# Patient Record
Sex: MALE | NOT HISPANIC OR LATINO | URBAN - METROPOLITAN AREA
[De-identification: names, ages, dates, MRNs, and addresses within clinical notes are randomized per-mention and may not be internally consistent; named-entity substitution may affect disease eponyms.]

---

## 2018-08-27 ENCOUNTER — INPATIENT (INPATIENT)
Facility: HOSPITAL | Age: 38
LOS: 2 days | Discharge: HOME HEALTH | End: 2018-08-30
Payer: COMMERCIAL

## 2018-08-27 VITALS
RESPIRATION RATE: 16 BRPM | OXYGEN SATURATION: 100 % | HEART RATE: 100 BPM | DIASTOLIC BLOOD PRESSURE: 97 MMHG | TEMPERATURE: 99 F | SYSTOLIC BLOOD PRESSURE: 141 MMHG

## 2018-08-27 DIAGNOSIS — K85.90 ACUTE PANCREATITIS WITHOUT NECROSIS OR INFECTION, UNSPECIFIED: ICD-10-CM

## 2018-08-27 LAB
ALBUMIN SERPL ELPH-MCNC: 3.9 G/DL — SIGNIFICANT CHANGE UP (ref 3.3–5)
ALP SERPL-CCNC: 72 U/L — SIGNIFICANT CHANGE UP (ref 40–120)
ALT FLD-CCNC: 54 U/L — HIGH (ref 4–41)
APPEARANCE UR: CLEAR — SIGNIFICANT CHANGE UP
AST SERPL-CCNC: 25 U/L — SIGNIFICANT CHANGE UP (ref 4–40)
BACTERIA # UR AUTO: NEGATIVE — SIGNIFICANT CHANGE UP
BASOPHILS # BLD AUTO: 0.04 K/UL — SIGNIFICANT CHANGE UP (ref 0–0.2)
BASOPHILS NFR BLD AUTO: 0.4 % — SIGNIFICANT CHANGE UP (ref 0–2)
BILIRUB SERPL-MCNC: 0.6 MG/DL — SIGNIFICANT CHANGE UP (ref 0.2–1.2)
BILIRUB UR-MCNC: NEGATIVE — SIGNIFICANT CHANGE UP
BLOOD UR QL VISUAL: SIGNIFICANT CHANGE UP
BUN SERPL-MCNC: 7 MG/DL — SIGNIFICANT CHANGE UP (ref 7–23)
BUN SERPL-MCNC: 9 MG/DL — SIGNIFICANT CHANGE UP (ref 7–23)
CALCIUM SERPL-MCNC: 9.7 MG/DL — SIGNIFICANT CHANGE UP (ref 8.4–10.5)
CALCIUM SERPL-MCNC: 9.9 MG/DL — SIGNIFICANT CHANGE UP (ref 8.4–10.5)
CHLORIDE SERPL-SCNC: 94 MMOL/L — LOW (ref 98–107)
CHLORIDE SERPL-SCNC: 97 MMOL/L — LOW (ref 98–107)
CO2 SERPL-SCNC: 24 MMOL/L — SIGNIFICANT CHANGE UP (ref 22–31)
CO2 SERPL-SCNC: 24 MMOL/L — SIGNIFICANT CHANGE UP (ref 22–31)
COLOR SPEC: YELLOW — SIGNIFICANT CHANGE UP
CREAT SERPL-MCNC: 0.73 MG/DL — SIGNIFICANT CHANGE UP (ref 0.5–1.3)
CREAT SERPL-MCNC: 0.79 MG/DL — SIGNIFICANT CHANGE UP (ref 0.5–1.3)
EOSINOPHIL # BLD AUTO: 0.2 K/UL — SIGNIFICANT CHANGE UP (ref 0–0.5)
EOSINOPHIL NFR BLD AUTO: 1.9 % — SIGNIFICANT CHANGE UP (ref 0–6)
GLUCOSE BLDC GLUCOMTR-MCNC: 122 MG/DL — HIGH (ref 70–99)
GLUCOSE BLDC GLUCOMTR-MCNC: 146 MG/DL — HIGH (ref 70–99)
GLUCOSE BLDC GLUCOMTR-MCNC: 180 MG/DL — HIGH (ref 70–99)
GLUCOSE BLDC GLUCOMTR-MCNC: 192 MG/DL — HIGH (ref 70–99)
GLUCOSE SERPL-MCNC: 117 MG/DL — HIGH (ref 70–99)
GLUCOSE SERPL-MCNC: 295 MG/DL — HIGH (ref 70–99)
GLUCOSE UR-MCNC: >1000 — SIGNIFICANT CHANGE UP
HCT VFR BLD CALC: 44 % — SIGNIFICANT CHANGE UP (ref 39–50)
HGB BLD-MCNC: 14.3 G/DL — SIGNIFICANT CHANGE UP (ref 13–17)
HYALINE CASTS # UR AUTO: NEGATIVE — SIGNIFICANT CHANGE UP
IMM GRANULOCYTES # BLD AUTO: 0.05 # — SIGNIFICANT CHANGE UP
IMM GRANULOCYTES NFR BLD AUTO: 0.5 % — SIGNIFICANT CHANGE UP (ref 0–1.5)
KETONES UR-MCNC: HIGH
LEUKOCYTE ESTERASE UR-ACNC: NEGATIVE — SIGNIFICANT CHANGE UP
LIDOCAIN IGE QN: 56.5 U/L — SIGNIFICANT CHANGE UP (ref 7–60)
LYMPHOCYTES # BLD AUTO: 2.45 K/UL — SIGNIFICANT CHANGE UP (ref 1–3.3)
LYMPHOCYTES # BLD AUTO: 23.6 % — SIGNIFICANT CHANGE UP (ref 13–44)
MAGNESIUM SERPL-MCNC: 1.9 MG/DL — SIGNIFICANT CHANGE UP (ref 1.6–2.6)
MCHC RBC-ENTMCNC: 28.7 PG — SIGNIFICANT CHANGE UP (ref 27–34)
MCHC RBC-ENTMCNC: 32.5 % — SIGNIFICANT CHANGE UP (ref 32–36)
MCV RBC AUTO: 88.4 FL — SIGNIFICANT CHANGE UP (ref 80–100)
MONOCYTES # BLD AUTO: 0.66 K/UL — SIGNIFICANT CHANGE UP (ref 0–0.9)
MONOCYTES NFR BLD AUTO: 6.3 % — SIGNIFICANT CHANGE UP (ref 2–14)
NEUTROPHILS # BLD AUTO: 7 K/UL — SIGNIFICANT CHANGE UP (ref 1.8–7.4)
NEUTROPHILS NFR BLD AUTO: 67.3 % — SIGNIFICANT CHANGE UP (ref 43–77)
NITRITE UR-MCNC: NEGATIVE — SIGNIFICANT CHANGE UP
NRBC # FLD: 0 — SIGNIFICANT CHANGE UP
PH UR: 6.5 — SIGNIFICANT CHANGE UP (ref 5–8)
PHOSPHATE SERPL-MCNC: 2.3 MG/DL — LOW (ref 2.5–4.5)
PLATELET # BLD AUTO: 261 K/UL — SIGNIFICANT CHANGE UP (ref 150–400)
PMV BLD: 10.6 FL — SIGNIFICANT CHANGE UP (ref 7–13)
POTASSIUM SERPL-MCNC: 3.4 MMOL/L — LOW (ref 3.5–5.3)
POTASSIUM SERPL-MCNC: 4.2 MMOL/L — SIGNIFICANT CHANGE UP (ref 3.5–5.3)
POTASSIUM SERPL-SCNC: 3.4 MMOL/L — LOW (ref 3.5–5.3)
POTASSIUM SERPL-SCNC: 4.2 MMOL/L — SIGNIFICANT CHANGE UP (ref 3.5–5.3)
PROT SERPL-MCNC: 8 G/DL — SIGNIFICANT CHANGE UP (ref 6–8.3)
PROT UR-MCNC: HIGH
RBC # BLD: 4.98 M/UL — SIGNIFICANT CHANGE UP (ref 4.2–5.8)
RBC # FLD: 13.1 % — SIGNIFICANT CHANGE UP (ref 10.3–14.5)
RBC CASTS # UR COMP ASSIST: SIGNIFICANT CHANGE UP (ref 0–?)
SODIUM SERPL-SCNC: 133 MMOL/L — LOW (ref 135–145)
SODIUM SERPL-SCNC: 136 MMOL/L — SIGNIFICANT CHANGE UP (ref 135–145)
SP GR SPEC: > 1.05 — HIGH (ref 1–1.04)
SQUAMOUS # UR AUTO: SIGNIFICANT CHANGE UP
TRIGL SERPL-MCNC: 1462 MG/DL — HIGH (ref 10–149)
TRIGL SERPL-MCNC: 1870 MG/DL — HIGH (ref 10–149)
UROBILINOGEN FLD QL: NORMAL — SIGNIFICANT CHANGE UP
WBC # BLD: 10.4 K/UL — SIGNIFICANT CHANGE UP (ref 3.8–10.5)
WBC # FLD AUTO: 10.4 K/UL — SIGNIFICANT CHANGE UP (ref 3.8–10.5)
WBC UR QL: SIGNIFICANT CHANGE UP (ref 0–?)

## 2018-08-27 PROCEDURE — 74177 CT ABD & PELVIS W/CONTRAST: CPT | Mod: 26

## 2018-08-27 PROCEDURE — 76705 ECHO EXAM OF ABDOMEN: CPT | Mod: 26

## 2018-08-27 RX ORDER — INSULIN HUMAN 100 [IU]/ML
8 INJECTION, SOLUTION SUBCUTANEOUS
Qty: 100 | Refills: 0 | Status: DISCONTINUED | OUTPATIENT
Start: 2018-08-27 | End: 2018-08-29

## 2018-08-27 RX ORDER — ACETAMINOPHEN 500 MG
1000 TABLET ORAL ONCE
Qty: 0 | Refills: 0 | Status: COMPLETED | OUTPATIENT
Start: 2018-08-27 | End: 2018-08-27

## 2018-08-27 RX ORDER — ATORVASTATIN CALCIUM 80 MG/1
80 TABLET, FILM COATED ORAL ONCE
Qty: 0 | Refills: 0 | Status: COMPLETED | OUTPATIENT
Start: 2018-08-27 | End: 2018-08-27

## 2018-08-27 RX ORDER — DEXTROSE 50 % IN WATER 50 %
50 SYRINGE (ML) INTRAVENOUS ONCE
Qty: 0 | Refills: 0 | Status: COMPLETED | OUTPATIENT
Start: 2018-08-27 | End: 2018-08-27

## 2018-08-27 RX ORDER — OMEGA-3 ACID ETHYL ESTERS 1 G
2 CAPSULE ORAL ONCE
Qty: 0 | Refills: 0 | Status: COMPLETED | OUTPATIENT
Start: 2018-08-27 | End: 2018-08-27

## 2018-08-27 RX ORDER — MORPHINE SULFATE 50 MG/1
4 CAPSULE, EXTENDED RELEASE ORAL ONCE
Qty: 0 | Refills: 0 | Status: DISCONTINUED | OUTPATIENT
Start: 2018-08-27 | End: 2018-08-27

## 2018-08-27 RX ORDER — FENOFIBRATE,MICRONIZED 130 MG
145 CAPSULE ORAL ONCE
Qty: 0 | Refills: 0 | Status: COMPLETED | OUTPATIENT
Start: 2018-08-27 | End: 2018-08-27

## 2018-08-27 RX ORDER — SODIUM CHLORIDE 9 MG/ML
1000 INJECTION, SOLUTION INTRAVENOUS
Qty: 0 | Refills: 0 | Status: DISCONTINUED | OUTPATIENT
Start: 2018-08-27 | End: 2018-08-29

## 2018-08-27 RX ORDER — SODIUM CHLORIDE 9 MG/ML
1000 INJECTION, SOLUTION INTRAVENOUS ONCE
Qty: 0 | Refills: 0 | Status: COMPLETED | OUTPATIENT
Start: 2018-08-27 | End: 2018-08-27

## 2018-08-27 RX ORDER — ENOXAPARIN SODIUM 100 MG/ML
40 INJECTION SUBCUTANEOUS DAILY
Qty: 0 | Refills: 0 | Status: DISCONTINUED | OUTPATIENT
Start: 2018-08-27 | End: 2018-08-30

## 2018-08-27 RX ORDER — HYDROMORPHONE HYDROCHLORIDE 2 MG/ML
0.5 INJECTION INTRAMUSCULAR; INTRAVENOUS; SUBCUTANEOUS
Qty: 0 | Refills: 0 | Status: DISCONTINUED | OUTPATIENT
Start: 2018-08-27 | End: 2018-08-28

## 2018-08-27 RX ADMIN — SODIUM CHLORIDE 1000 MILLILITER(S): 9 INJECTION, SOLUTION INTRAVENOUS at 11:37

## 2018-08-27 RX ADMIN — INSULIN HUMAN 11.17 UNIT(S)/HR: 100 INJECTION, SOLUTION SUBCUTANEOUS at 20:11

## 2018-08-27 RX ADMIN — MORPHINE SULFATE 4 MILLIGRAM(S): 50 CAPSULE, EXTENDED RELEASE ORAL at 15:38

## 2018-08-27 RX ADMIN — Medication 400 MILLIGRAM(S): at 11:30

## 2018-08-27 RX ADMIN — Medication 1000 MILLIGRAM(S): at 12:15

## 2018-08-27 RX ADMIN — INSULIN HUMAN 11.17 UNIT(S)/HR: 100 INJECTION, SOLUTION SUBCUTANEOUS at 17:47

## 2018-08-27 RX ADMIN — ATORVASTATIN CALCIUM 80 MILLIGRAM(S): 80 TABLET, FILM COATED ORAL at 18:00

## 2018-08-27 RX ADMIN — Medication 145 MILLIGRAM(S): at 18:04

## 2018-08-27 RX ADMIN — Medication 2 GRAM(S): at 18:04

## 2018-08-27 RX ADMIN — HYDROMORPHONE HYDROCHLORIDE 0.5 MILLIGRAM(S): 2 INJECTION INTRAMUSCULAR; INTRAVENOUS; SUBCUTANEOUS at 21:56

## 2018-08-27 RX ADMIN — SODIUM CHLORIDE 100 MILLILITER(S): 9 INJECTION, SOLUTION INTRAVENOUS at 20:11

## 2018-08-27 RX ADMIN — Medication 50 MILLILITER(S): at 21:30

## 2018-08-27 RX ADMIN — SODIUM CHLORIDE 1000 MILLILITER(S): 9 INJECTION, SOLUTION INTRAVENOUS at 17:48

## 2018-08-27 RX ADMIN — HYDROMORPHONE HYDROCHLORIDE 0.5 MILLIGRAM(S): 2 INJECTION INTRAMUSCULAR; INTRAVENOUS; SUBCUTANEOUS at 21:41

## 2018-08-27 NOTE — ED PROVIDER NOTE - PHYSICAL EXAMINATION
VS:  unremarkable except tachycardia    GEN - malaise A+O x3   HEAD - NC/AT     ENT - PEERL, EOMI, mucous membranes  dry, no discharge      NECK: Neck supple, non-tender without lymphadenopathy, no masses, no JVD  PULM - CTA b/l,  symmetric breath sounds  COR -  normal heart sounds    ABD - , periumbilical, RLQ ttp, soft,  BACK - no CVA tenderness, nontender spine     EXTREMS - no edema, no deformity, warm and well perfused    SKIN - no rash or bruising      NEUROLOGIC - alert, CN 2-12 intact, sensation nl, motor no focal deficit.

## 2018-08-27 NOTE — CONSULT NOTE ADULT - ASSESSMENT
38M PMH gout presenting with abdominal pain, high triglycerides with CT concerning for acute pancreatitis.  MICU consulted for possible need for insulin gtt and admission to MICU.    Plan following evaluation with full MICU team. 38M PMH gout presenting with abdominal pain, high triglycerides with CT concerning for acute pancreatitis.  MICU consulted for possible need for insulin gtt and admission to MICU.    Plan:  - Pt will likely need a insulin gtt for triglyceride pancreatitis, please admit to MICU for further care.    Holli Green MD, PhD  PGY-2| Internal Medicine  [phone] / [phone]

## 2018-08-27 NOTE — H&P ADULT - ASSESSMENT
38M PMH gout presenting with abdominal pain, high triglycerides with CT concerning for acute pancreatitis.  MICU consulted for possible need for insulin gtt and admission to MICU.    Neuro:   - alert, oriented, protecting airway  - hydromorphone pushes for pain, 1 mg Q2H PRN    CV:   - hemodynamically stable, no current issues.      Pulm:   - No active issues at this time.  - Pt does have intermittent desats while sleeping, should have sleep study outpatient.    GI:   - c/w insulin gtt at 8U  - start D5 NS at 150cc/hr  - q6h triglycerides  - continue to monitor FSG q1hr, goal > 70  - can stop insulin gtt when triglycerides are <500  - c/w carb consistent diet with low fat    Renal/:  - c/w strict i/o    Endo:  - patient has no history of diabetes but presents with serum glucose about 300 and >1000 glucose in urine. Patient likely has new onset diabetes  - insulin ggt, currently 12 units/hr, FSG improved    ID:   - no issues    Heme:  No acute issues, H/H stable.    VTE PPX:  - Heparin SQ 38M PMH gout presenting with abdominal pain, high triglycerides with CT concerning for acute pancreatitis.  MICU consulted for possible need for insulin gtt and admission to MICU.    Neuro:   - alert, oriented, protecting airway  - hydromorphone pushes for pain, 1 mg Q2H PRN    CV:   - hemodynamically stable, no current issues.      Pulm:   - No active issues at this time.  - Pt does have intermittent desats while sleeping, should have sleep study outpatient.    GI:   - c/w insulin gtt at 11U  - start D5 NS at 100cc/hr  - q6h triglycerides  - continue to monitor FSG q1hr, goal > 70  - can stop insulin gtt when triglycerides are <500  - c/w carb consistent diet with low fat    Renal/:  - c/w strict i/o    Endo:  - patient has no history of diabetes but presents with serum glucose about 300 and >1000 glucose in urine. Patient likely has new onset diabetes  - f/u Hemoglobin A1c     ID:   - no issues    Heme:  No acute issues, H/H stable.    VTE PPX:  - Lovenox 40mg QD

## 2018-08-27 NOTE — H&P ADULT - NSHPLABSRESULTS_GEN_ALL_CORE
LABS:                        14.3   10.40 )-----------( 261      ( 27 Aug 2018 11:20 )             44.0     08-27    133<L>  |  94<L>  |  9   ----------------------------<  295<H>  4.2   |  24  |  0.79    Ca    9.9      27 Aug 2018 11:20    TPro  8.0  /  Alb  3.9  /  TBili  0.6  /  DBili  x   /  AST  25  /  ALT  54<H>  /  AlkPhos  72  08-27    Triglycerides 1870    Urinalysis Basic - ( 27 Aug 2018 11:20 )    Color: YELLOW / Appearance: CLEAR / SG: > 1.050 / pH: 6.5  Gluc: >1000 / Ketone: LARGE  / Bili: NEGATIVE / Urobili: NORMAL   Blood: TRACE / Protein: MODERATE / Nitrite: NEGATIVE   Leuk Esterase: NEGATIVE / RBC: 0-2 / WBC 0-2   Sq Epi: OCC / Non Sq Epi: x / Bacteria: NEGATIVE        RADIOLOGY & ADDITIONAL TESTS: Reviewed.    < from: CT Abdomen and Pelvis w/ IV Cont (08.27.18 @ 12:01) >    IMPRESSION: Mild peripancreatic inflammation suggestive of acute   pancreatitis. Correlation with amylase and lipase recommended.  Hepatic steatosis.    < end of copied text >    < from: US Abdomen Limited (08.27.18 @ 16:38) >    IMPRESSION:     Hepatic steatosis.    < end of copied text > LABS:                        14.3   10.40 )-----------( 261      ( 27 Aug 2018 11:20 )             44.0     08-27    133<L>  |  94<L>  |  9   ----------------------------<  295<H>  4.2   |  24  |  0.79    Ca    9.9      27 Aug 2018 11:20    TPro  8.0  /  Alb  3.9  /  TBili  0.6  /  DBili  x   /  AST  25  /  ALT  54<H>  /  AlkPhos  72  08-27    Triglycerides 1870  Lipase 59    Urinalysis Basic - ( 27 Aug 2018 11:20 )    Color: YELLOW / Appearance: CLEAR / SG: > 1.050 / pH: 6.5  Gluc: >1000 / Ketone: LARGE  / Bili: NEGATIVE / Urobili: NORMAL   Blood: TRACE / Protein: MODERATE / Nitrite: NEGATIVE   Leuk Esterase: NEGATIVE / RBC: 0-2 / WBC 0-2   Sq Epi: OCC / Non Sq Epi: x / Bacteria: NEGATIVE        RADIOLOGY & ADDITIONAL TESTS: Reviewed.    < from: CT Abdomen and Pelvis w/ IV Cont (08.27.18 @ 12:01) >    IMPRESSION: Mild peripancreatic inflammation suggestive of acute   pancreatitis. Correlation with amylase and lipase recommended.  Hepatic steatosis.    < end of copied text >    < from: US Abdomen Limited (08.27.18 @ 16:38) >    IMPRESSION:     Hepatic steatosis.    < end of copied text >

## 2018-08-27 NOTE — H&P ADULT - HISTORY OF PRESENT ILLNESS
38M PMH gout (intermittently takes advil and Cocrys) presenting with 5 days of abdominal pain.  Per pt, he was in [place] until 8/23, when he started having pain in lower abdomen and epigastric area.  Per pt, he was in [place] for 1 week, returned on 8/23.  Pt stated he removed bags from overhead bin in airplane at which point he felt some pain in lower abdomen.  Pt states he thought it was a muscle strain, thought nothing of it.  The next day, pain continued in lower abdomen with sharp pain in epigastric region as well, pt states he started taking gas-x thinking it was gas pain, which helped a little.  Pain continued over the weekend.  Pt states he took Advil which helped a little with the pain, however the pain did not go away.  Over the weekend, pain started going into the lower back.  This morning, at 5 am, pt started having sharp pain again in lower abdomen and took Advil  Pain did not resolve so patient came to ED for further evaluation. Pt had not had pain like this in the past.    In the ED, vital signs T: 98.8, , /97, RR 16, 100% RA.  Pt had CT scan showing mild peripancreatic stranding suggestive of acute pancreatitis, with triglycerides to 1800s.  MICU was consulted for high triglycerides causing pancreatitis.    Of note, patient states he had been drinking heavily in the last week in [place], up to 1/2 pint of [place] daily with [employer]  Pt states his mother has high triglycerides as well, never had pancreatitis.

## 2018-08-27 NOTE — H&P ADULT - NSHPPHYSICALEXAM_GEN_ALL_CORE
Appearance: Sitting in bed, NAD  HEENT:   Normal oral mucosa, PERRL, EOMI, anicteric sclera  Lymphatic: No lymphadenopathy in cervical nodes or inguinal nodes  Cardiovascular: tachycardic, regular rhythm, No murmurs, gallops or rubs appreciated  Respiratory: Lungs clear to auscultation bilaterally, no wheezes, crackles appreciated  Gastrointestinal:  Soft, nondistended, nontender; no inguinal or umbilical hernia noted  Skin: No rashes, eccymosis or cyanosis noted	  Neurologic: AOx3, CNII-XII grossly intact, motor and sensory function grossly intact  Extremities: Moving all extremities equally, no edema noted  Psych:  Normal mood and affect, responds to questions appropriately Appearance: Sitting in bed, NAD  HEENT:   Normal oral mucosa, PERRL, EOMI, anicteric sclera  Lymphatic: No lymphadenopathy in cervical nodes or inguinal nodes  Cardiovascular: tachycardic, regular rhythm, No murmurs, gallops or rubs appreciated  Respiratory: Lungs clear to auscultation bilaterally, no wheezes, crackles appreciated  Gastrointestinal:  Soft, nondistended, nontender; no inguinal or umbilical hernia noted  Skin: No rashes, ecchymosis or cyanosis noted	  Neurologic: AOx3, CNII-XII grossly intact, motor and sensory function grossly intact  Extremities: Moving all extremities equally, no edema noted  Psych:  Normal mood and affect, responds to questions appropriately

## 2018-08-27 NOTE — H&P ADULT - NSHPREVIEWOFSYSTEMS_GEN_ALL_CORE
CONSTITUTIONAL: Endorses fevers and chills with pain.  EYES/ENT: No visual changes;  No vertigo or throat pain   NECK: No pain or stiffness  RESPIRATORY: No cough, wheezing, hemoptysis; No shortness of breath  CARDIOVASCULAR: No chest pain or palpitations  GASTROINTESTINAL: See HPI  GENITOURINARY: No dysuria, frequency or hematuria  NEUROLOGICAL: No numbness or weakness  SKIN: No itching, rashes

## 2018-08-27 NOTE — ED PROVIDER NOTE - MEDICAL DECISION MAKING DETAILS
38M p/w lower abd pain x 4 days, below the umbilicus, some radiation to RLQ.  Was in [place], came back last week the next day the pain started.  No fever, no vomiting, no diarrhea.  No sick contact.  No dysuria, No blood in stool.  No testicle pain.  Never happened before.  Tried Gas-X without improvement.  Some improvement with advil.  RLQ ttp, will check labs, urine, CT, rx pain med, IV tylenol, NPO for now.  Reassess.

## 2018-08-27 NOTE — H&P ADULT - NSHPSOCIALHISTORY_GEN_ALL_CORE
Patient smokes 0.5 packs per week. Patient has been drinking about 350mL of Whiskey as well as a few margaritas daily during his vacation to [place]. Patient smokes 0.5 PPD for 10 years. Patient has been drinking about 350mL of Whiskey as well as a few margaritas daily during his vacation to [place].

## 2018-08-27 NOTE — ED PROVIDER NOTE - PROGRESS NOTE DETAILS
D/w endo - recc PO meds atorvastatin, lovaza, fenofibrate, consult MICU for insulin gtt.  [facility]

## 2018-08-27 NOTE — CONSULT NOTE ADULT - ATTENDING COMMENTS
Critically ill    Frequent bedside visits with therapy change today.   Crit Care Time Today 35 min+    Patient examined and case reviewed in detail on bedside rounds

## 2018-08-27 NOTE — ED PROVIDER NOTE - OBJECTIVE STATEMENT
38M p/w lower abd pain x 4 days, below the umbilicus, some radiation to RLQ.  Was in [place], came back last week the next day the pain started.  No fever, no vomiting, no diarrhea.  No sick contact.  No dysuria, No blood in stool.  No testicle pain.  Never happened before.  Tried Gas-X without improvement.  Some improvement with advil.  RLQ ttp, will check labs, urine, CT, rx pain med, IV tylenol, NPO for now.  Reassess.   PMHX - none  meds- none  PSHX - none  (+)T  All - NKA

## 2018-08-28 DIAGNOSIS — K85.80 OTHER ACUTE PANCREATITIS WITHOUT NECROSIS OR INFECTION: ICD-10-CM

## 2018-08-28 DIAGNOSIS — E11.9 TYPE 2 DIABETES MELLITUS WITHOUT COMPLICATIONS: ICD-10-CM

## 2018-08-28 DIAGNOSIS — E78.1 PURE HYPERGLYCERIDEMIA: ICD-10-CM

## 2018-08-28 LAB
ALBUMIN SERPL ELPH-MCNC: 3.3 G/DL — SIGNIFICANT CHANGE UP (ref 3.3–5)
ALP SERPL-CCNC: 52 U/L — SIGNIFICANT CHANGE UP (ref 40–120)
ALT FLD-CCNC: 38 U/L — SIGNIFICANT CHANGE UP (ref 4–41)
AST SERPL-CCNC: 23 U/L — SIGNIFICANT CHANGE UP (ref 4–40)
BASOPHILS # BLD AUTO: 0.04 K/UL — SIGNIFICANT CHANGE UP (ref 0–0.2)
BASOPHILS NFR BLD AUTO: 0.5 % — SIGNIFICANT CHANGE UP (ref 0–2)
BILIRUB SERPL-MCNC: 0.5 MG/DL — SIGNIFICANT CHANGE UP (ref 0.2–1.2)
BUN SERPL-MCNC: 4 MG/DL — LOW (ref 7–23)
BUN SERPL-MCNC: 5 MG/DL — LOW (ref 7–23)
CALCIUM SERPL-MCNC: 8.9 MG/DL — SIGNIFICANT CHANGE UP (ref 8.4–10.5)
CALCIUM SERPL-MCNC: 9 MG/DL — SIGNIFICANT CHANGE UP (ref 8.4–10.5)
CALCIUM SERPL-MCNC: 9 MG/DL — SIGNIFICANT CHANGE UP (ref 8.4–10.5)
CALCIUM SERPL-MCNC: 9.3 MG/DL — SIGNIFICANT CHANGE UP (ref 8.4–10.5)
CHLORIDE SERPL-SCNC: 100 MMOL/L — SIGNIFICANT CHANGE UP (ref 98–107)
CHLORIDE SERPL-SCNC: 102 MMOL/L — SIGNIFICANT CHANGE UP (ref 98–107)
CHLORIDE SERPL-SCNC: 102 MMOL/L — SIGNIFICANT CHANGE UP (ref 98–107)
CHLORIDE SERPL-SCNC: 97 MMOL/L — LOW (ref 98–107)
CHOLEST SERPL-MCNC: 260 MG/DL — HIGH (ref 120–199)
CO2 SERPL-SCNC: 23 MMOL/L — SIGNIFICANT CHANGE UP (ref 22–31)
CO2 SERPL-SCNC: 23 MMOL/L — SIGNIFICANT CHANGE UP (ref 22–31)
CO2 SERPL-SCNC: 24 MMOL/L — SIGNIFICANT CHANGE UP (ref 22–31)
CO2 SERPL-SCNC: 24 MMOL/L — SIGNIFICANT CHANGE UP (ref 22–31)
CREAT SERPL-MCNC: 0.71 MG/DL — SIGNIFICANT CHANGE UP (ref 0.5–1.3)
CREAT SERPL-MCNC: 0.72 MG/DL — SIGNIFICANT CHANGE UP (ref 0.5–1.3)
CREAT SERPL-MCNC: 0.78 MG/DL — SIGNIFICANT CHANGE UP (ref 0.5–1.3)
CREAT SERPL-MCNC: 0.85 MG/DL — SIGNIFICANT CHANGE UP (ref 0.5–1.3)
EOSINOPHIL # BLD AUTO: 0.19 K/UL — SIGNIFICANT CHANGE UP (ref 0–0.5)
EOSINOPHIL NFR BLD AUTO: 2.3 % — SIGNIFICANT CHANGE UP (ref 0–6)
GLUCOSE BLDC GLUCOMTR-MCNC: 107 MG/DL — HIGH (ref 70–99)
GLUCOSE BLDC GLUCOMTR-MCNC: 113 MG/DL — HIGH (ref 70–99)
GLUCOSE BLDC GLUCOMTR-MCNC: 114 MG/DL — HIGH (ref 70–99)
GLUCOSE BLDC GLUCOMTR-MCNC: 115 MG/DL — HIGH (ref 70–99)
GLUCOSE BLDC GLUCOMTR-MCNC: 118 MG/DL — HIGH (ref 70–99)
GLUCOSE BLDC GLUCOMTR-MCNC: 119 MG/DL — HIGH (ref 70–99)
GLUCOSE BLDC GLUCOMTR-MCNC: 120 MG/DL — HIGH (ref 70–99)
GLUCOSE BLDC GLUCOMTR-MCNC: 123 MG/DL — HIGH (ref 70–99)
GLUCOSE BLDC GLUCOMTR-MCNC: 132 MG/DL — HIGH (ref 70–99)
GLUCOSE BLDC GLUCOMTR-MCNC: 135 MG/DL — HIGH (ref 70–99)
GLUCOSE BLDC GLUCOMTR-MCNC: 139 MG/DL — HIGH (ref 70–99)
GLUCOSE BLDC GLUCOMTR-MCNC: 144 MG/DL — HIGH (ref 70–99)
GLUCOSE BLDC GLUCOMTR-MCNC: 144 MG/DL — HIGH (ref 70–99)
GLUCOSE BLDC GLUCOMTR-MCNC: 151 MG/DL — HIGH (ref 70–99)
GLUCOSE BLDC GLUCOMTR-MCNC: 157 MG/DL — HIGH (ref 70–99)
GLUCOSE BLDC GLUCOMTR-MCNC: 159 MG/DL — HIGH (ref 70–99)
GLUCOSE BLDC GLUCOMTR-MCNC: 169 MG/DL — HIGH (ref 70–99)
GLUCOSE BLDC GLUCOMTR-MCNC: 169 MG/DL — HIGH (ref 70–99)
GLUCOSE BLDC GLUCOMTR-MCNC: 170 MG/DL — HIGH (ref 70–99)
GLUCOSE BLDC GLUCOMTR-MCNC: 171 MG/DL — HIGH (ref 70–99)
GLUCOSE BLDC GLUCOMTR-MCNC: 187 MG/DL — HIGH (ref 70–99)
GLUCOSE BLDC GLUCOMTR-MCNC: 205 MG/DL — HIGH (ref 70–99)
GLUCOSE BLDC GLUCOMTR-MCNC: 217 MG/DL — HIGH (ref 70–99)
GLUCOSE BLDC GLUCOMTR-MCNC: 244 MG/DL — HIGH (ref 70–99)
GLUCOSE BLDC GLUCOMTR-MCNC: 96 MG/DL — SIGNIFICANT CHANGE UP (ref 70–99)
GLUCOSE SERPL-MCNC: 126 MG/DL — HIGH (ref 70–99)
GLUCOSE SERPL-MCNC: 147 MG/DL — HIGH (ref 70–99)
GLUCOSE SERPL-MCNC: 168 MG/DL — HIGH (ref 70–99)
GLUCOSE SERPL-MCNC: 218 MG/DL — HIGH (ref 70–99)
HBA1C BLD-MCNC: 10.3 % — HIGH (ref 4–5.6)
HCT VFR BLD CALC: 36.8 % — LOW (ref 39–50)
HDLC SERPL-MCNC: 13 MG/DL — LOW (ref 35–55)
HGB BLD-MCNC: 11.9 G/DL — LOW (ref 13–17)
IMM GRANULOCYTES # BLD AUTO: 0.04 # — SIGNIFICANT CHANGE UP
IMM GRANULOCYTES NFR BLD AUTO: 0.5 % — SIGNIFICANT CHANGE UP (ref 0–1.5)
LIDOCAIN IGE QN: 52.8 U/L — SIGNIFICANT CHANGE UP (ref 7–60)
LIPID PNL WITH DIRECT LDL SERPL: 59 MG/DL — SIGNIFICANT CHANGE UP
LYMPHOCYTES # BLD AUTO: 1.64 K/UL — SIGNIFICANT CHANGE UP (ref 1–3.3)
LYMPHOCYTES # BLD AUTO: 19.7 % — SIGNIFICANT CHANGE UP (ref 13–44)
MAGNESIUM SERPL-MCNC: 1.8 MG/DL — SIGNIFICANT CHANGE UP (ref 1.6–2.6)
MAGNESIUM SERPL-MCNC: 1.9 MG/DL — SIGNIFICANT CHANGE UP (ref 1.6–2.6)
MCHC RBC-ENTMCNC: 28.4 PG — SIGNIFICANT CHANGE UP (ref 27–34)
MCHC RBC-ENTMCNC: 32.3 % — SIGNIFICANT CHANGE UP (ref 32–36)
MCV RBC AUTO: 87.8 FL — SIGNIFICANT CHANGE UP (ref 80–100)
MONOCYTES # BLD AUTO: 0.66 K/UL — SIGNIFICANT CHANGE UP (ref 0–0.9)
MONOCYTES NFR BLD AUTO: 7.9 % — SIGNIFICANT CHANGE UP (ref 2–14)
NEUTROPHILS # BLD AUTO: 5.75 K/UL — SIGNIFICANT CHANGE UP (ref 1.8–7.4)
NEUTROPHILS NFR BLD AUTO: 69.1 % — SIGNIFICANT CHANGE UP (ref 43–77)
NRBC # FLD: 0 — SIGNIFICANT CHANGE UP
PHOSPHATE SERPL-MCNC: 2.9 MG/DL — SIGNIFICANT CHANGE UP (ref 2.5–4.5)
PHOSPHATE SERPL-MCNC: 3 MG/DL — SIGNIFICANT CHANGE UP (ref 2.5–4.5)
PHOSPHATE SERPL-MCNC: 3.5 MG/DL — SIGNIFICANT CHANGE UP (ref 2.5–4.5)
PHOSPHATE SERPL-MCNC: 3.9 MG/DL — SIGNIFICANT CHANGE UP (ref 2.5–4.5)
PLATELET # BLD AUTO: 242 K/UL — SIGNIFICANT CHANGE UP (ref 150–400)
PMV BLD: 10.2 FL — SIGNIFICANT CHANGE UP (ref 7–13)
POTASSIUM SERPL-MCNC: 3.2 MMOL/L — LOW (ref 3.5–5.3)
POTASSIUM SERPL-MCNC: 3.5 MMOL/L — SIGNIFICANT CHANGE UP (ref 3.5–5.3)
POTASSIUM SERPL-MCNC: 3.7 MMOL/L — SIGNIFICANT CHANGE UP (ref 3.5–5.3)
POTASSIUM SERPL-MCNC: 3.7 MMOL/L — SIGNIFICANT CHANGE UP (ref 3.5–5.3)
POTASSIUM SERPL-SCNC: 3.2 MMOL/L — LOW (ref 3.5–5.3)
POTASSIUM SERPL-SCNC: 3.5 MMOL/L — SIGNIFICANT CHANGE UP (ref 3.5–5.3)
POTASSIUM SERPL-SCNC: 3.7 MMOL/L — SIGNIFICANT CHANGE UP (ref 3.5–5.3)
POTASSIUM SERPL-SCNC: 3.7 MMOL/L — SIGNIFICANT CHANGE UP (ref 3.5–5.3)
PROT SERPL-MCNC: 6.4 G/DL — SIGNIFICANT CHANGE UP (ref 6–8.3)
RBC # BLD: 4.19 M/UL — LOW (ref 4.2–5.8)
RBC # FLD: 13.2 % — SIGNIFICANT CHANGE UP (ref 10.3–14.5)
SODIUM SERPL-SCNC: 135 MMOL/L — SIGNIFICANT CHANGE UP (ref 135–145)
SODIUM SERPL-SCNC: 135 MMOL/L — SIGNIFICANT CHANGE UP (ref 135–145)
SODIUM SERPL-SCNC: 138 MMOL/L — SIGNIFICANT CHANGE UP (ref 135–145)
SODIUM SERPL-SCNC: 139 MMOL/L — SIGNIFICANT CHANGE UP (ref 135–145)
TRIGL SERPL-MCNC: 1053 MG/DL — HIGH (ref 10–149)
TRIGL SERPL-MCNC: 1063 MG/DL — HIGH (ref 10–149)
TRIGL SERPL-MCNC: 566 MG/DL — HIGH (ref 10–149)
WBC # BLD: 8.32 K/UL — SIGNIFICANT CHANGE UP (ref 3.8–10.5)
WBC # FLD AUTO: 8.32 K/UL — SIGNIFICANT CHANGE UP (ref 3.8–10.5)

## 2018-08-28 PROCEDURE — 99233 SBSQ HOSP IP/OBS HIGH 50: CPT | Mod: GC

## 2018-08-28 PROCEDURE — 99255 IP/OBS CONSLTJ NEW/EST HI 80: CPT | Mod: GC

## 2018-08-28 RX ORDER — CHLORHEXIDINE GLUCONATE 213 G/1000ML
1 SOLUTION TOPICAL
Qty: 0 | Refills: 0 | Status: DISCONTINUED | OUTPATIENT
Start: 2018-08-28 | End: 2018-08-29

## 2018-08-28 RX ORDER — OMEGA-3 ACID ETHYL ESTERS 1 G
2 CAPSULE ORAL
Qty: 0 | Refills: 0 | Status: DISCONTINUED | OUTPATIENT
Start: 2018-08-28 | End: 2018-08-30

## 2018-08-28 RX ORDER — HYDROMORPHONE HYDROCHLORIDE 2 MG/ML
1 INJECTION INTRAMUSCULAR; INTRAVENOUS; SUBCUTANEOUS
Qty: 0 | Refills: 0 | Status: DISCONTINUED | OUTPATIENT
Start: 2018-08-28 | End: 2018-08-30

## 2018-08-28 RX ORDER — OMEGA-3 ACID ETHYL ESTERS 1 G
2 CAPSULE ORAL
Qty: 0 | Refills: 0 | Status: DISCONTINUED | OUTPATIENT
Start: 2018-08-28 | End: 2018-08-28

## 2018-08-28 RX ORDER — DEXTROSE 50 % IN WATER 50 %
50 SYRINGE (ML) INTRAVENOUS ONCE
Qty: 0 | Refills: 0 | Status: COMPLETED | OUTPATIENT
Start: 2018-08-28 | End: 2018-08-28

## 2018-08-28 RX ORDER — FENOFIBRATE,MICRONIZED 130 MG
145 CAPSULE ORAL AT BEDTIME
Qty: 0 | Refills: 0 | Status: DISCONTINUED | OUTPATIENT
Start: 2018-08-28 | End: 2018-08-30

## 2018-08-28 RX ORDER — BENZOCAINE AND MENTHOL 5; 1 G/100ML; G/100ML
1 LIQUID ORAL
Qty: 0 | Refills: 0 | Status: DISCONTINUED | OUTPATIENT
Start: 2018-08-28 | End: 2018-08-30

## 2018-08-28 RX ORDER — POTASSIUM CHLORIDE 20 MEQ
40 PACKET (EA) ORAL EVERY 4 HOURS
Qty: 0 | Refills: 0 | Status: COMPLETED | OUTPATIENT
Start: 2018-08-28 | End: 2018-08-28

## 2018-08-28 RX ORDER — ATORVASTATIN CALCIUM 80 MG/1
40 TABLET, FILM COATED ORAL AT BEDTIME
Qty: 0 | Refills: 0 | Status: DISCONTINUED | OUTPATIENT
Start: 2018-08-28 | End: 2018-08-30

## 2018-08-28 RX ORDER — KETOROLAC TROMETHAMINE 30 MG/ML
15 SYRINGE (ML) INJECTION ONCE
Qty: 0 | Refills: 0 | Status: DISCONTINUED | OUTPATIENT
Start: 2018-08-28 | End: 2018-08-28

## 2018-08-28 RX ADMIN — SODIUM CHLORIDE 150 MILLILITER(S): 9 INJECTION, SOLUTION INTRAVENOUS at 03:53

## 2018-08-28 RX ADMIN — HYDROMORPHONE HYDROCHLORIDE 0.5 MILLIGRAM(S): 2 INJECTION INTRAMUSCULAR; INTRAVENOUS; SUBCUTANEOUS at 04:27

## 2018-08-28 RX ADMIN — Medication 40 MILLIEQUIVALENT(S): at 10:21

## 2018-08-28 RX ADMIN — SODIUM CHLORIDE 150 MILLILITER(S): 9 INJECTION, SOLUTION INTRAVENOUS at 10:22

## 2018-08-28 RX ADMIN — SODIUM CHLORIDE 150 MILLILITER(S): 9 INJECTION, SOLUTION INTRAVENOUS at 20:37

## 2018-08-28 RX ADMIN — Medication 40 MILLIEQUIVALENT(S): at 05:59

## 2018-08-28 RX ADMIN — INSULIN HUMAN 8 UNIT(S)/HR: 100 INJECTION, SOLUTION SUBCUTANEOUS at 20:37

## 2018-08-28 RX ADMIN — HYDROMORPHONE HYDROCHLORIDE 1 MILLIGRAM(S): 2 INJECTION INTRAMUSCULAR; INTRAVENOUS; SUBCUTANEOUS at 20:55

## 2018-08-28 RX ADMIN — Medication 15 MILLIGRAM(S): at 13:00

## 2018-08-28 RX ADMIN — ENOXAPARIN SODIUM 40 MILLIGRAM(S): 100 INJECTION SUBCUTANEOUS at 12:58

## 2018-08-28 RX ADMIN — HYDROMORPHONE HYDROCHLORIDE 1 MILLIGRAM(S): 2 INJECTION INTRAMUSCULAR; INTRAVENOUS; SUBCUTANEOUS at 20:37

## 2018-08-28 RX ADMIN — HYDROMORPHONE HYDROCHLORIDE 0.5 MILLIGRAM(S): 2 INJECTION INTRAMUSCULAR; INTRAVENOUS; SUBCUTANEOUS at 10:15

## 2018-08-28 RX ADMIN — Medication 15 MILLIGRAM(S): at 13:15

## 2018-08-28 RX ADMIN — Medication 2 GRAM(S): at 22:16

## 2018-08-28 RX ADMIN — INSULIN HUMAN 8 UNIT(S)/HR: 100 INJECTION, SOLUTION SUBCUTANEOUS at 10:22

## 2018-08-28 RX ADMIN — HYDROMORPHONE HYDROCHLORIDE 0.5 MILLIGRAM(S): 2 INJECTION INTRAMUSCULAR; INTRAVENOUS; SUBCUTANEOUS at 10:30

## 2018-08-28 RX ADMIN — INSULIN HUMAN 8 UNIT(S)/HR: 100 INJECTION, SOLUTION SUBCUTANEOUS at 04:13

## 2018-08-28 RX ADMIN — SODIUM CHLORIDE 150 MILLILITER(S): 9 INJECTION, SOLUTION INTRAVENOUS at 04:13

## 2018-08-28 RX ADMIN — Medication 50 MILLILITER(S): at 03:05

## 2018-08-28 RX ADMIN — HYDROMORPHONE HYDROCHLORIDE 0.5 MILLIGRAM(S): 2 INJECTION INTRAMUSCULAR; INTRAVENOUS; SUBCUTANEOUS at 04:12

## 2018-08-28 RX ADMIN — Medication 145 MILLIGRAM(S): at 22:16

## 2018-08-28 RX ADMIN — ATORVASTATIN CALCIUM 40 MILLIGRAM(S): 80 TABLET, FILM COATED ORAL at 22:16

## 2018-08-28 NOTE — CONSULT NOTE ADULT - ASSESSMENT
38M PMH gout (intermittently takes advil and Cocrys) presenting with 5 days of abdominal pain in RLQ admitted to MICU for acute pancreatitis 2/2 hypertriglyceridemia.

## 2018-08-28 NOTE — CONSULT NOTE ADULT - PROBLEM SELECTOR RECOMMENDATION 9
T1DM vs undiagnosed T2DM   While inpatient - c/w insulin drip + D5W NS for hypertriglyceridemia  switch to basal + bolus when TG < 500   low carb diet as tolerates  Goal HbA1c- 6.5-7.0, recent HbA1c -10.3  Goal -180 while inpatient  new dx Diabetes education   Nutrition consult  will check C- peptide, Anti LIANET Abs and Anti islet cell Abs  Upon dc - pt will continue with basal + bolus regimen vs oral + basal plan , initiated discussion  Pt will follow up with our o/p location [address]([phone])  TENTATIVE PLAN FINAL PLAN TO BE DISCUSSED WITH ATTENDING T1DM vs undiagnosed T2DM   While inpatient - c/w insulin drip + D5W NS for hypertriglyceridemia  switch to basal + bolus when TG < 500   low carb diet as tolerates  Goal HbA1c- 6.5-7.0, recent HbA1c -10.3  Goal -180 while inpatient  new dx Diabetes education   Nutrition consult  will check C- peptide, Anti LIANET Abs and Anti islet cell Abs  Upon dc - pt will continue with basal + bolus regimen vs oral + basal plan , initiated discussion  Pt will follow up with our o/p location [address]([phone])

## 2018-08-28 NOTE — PROGRESS NOTE ADULT - SUBJECTIVE AND OBJECTIVE BOX
INTERVAL HPI/OVERNIGHT EVENTS:    SUBJECTIVE: Patient seen and examined at bedside. Patient denies chest pain/shortness of breath/nausea. Patient continues to report suprapubic pain that worsens with urination.     CONSTITUTIONAL: No weakness, fevers or chills  EYES/ENT: No visual changes;  No vertigo or throat pain   NECK: No pain or stiffness  RESPIRATORY: No cough, wheezing, hemoptysis; No shortness of breath  CARDIOVASCULAR: No chest pain or palpitations  GASTROINTESTINAL: No abdominal or epigastric pain. No nausea, vomiting, or hematemesis; No diarrhea or constipation. No melena or hematochezia.  GENITOURINARY: No dysuria, frequency or hematuria  NEUROLOGICAL: No numbness or weakness  SKIN: No itching, rashes    OBJECTIVE:    VITAL SIGNS:  ICU Vital Signs Last 24 Hrs  T(C): 37.5 (28 Aug 2018 12:00), Max: 37.6 (27 Aug 2018 20:00)  T(F): 99.5 (28 Aug 2018 12:00), Max: 99.6 (27 Aug 2018 20:00)  HR: 86 (28 Aug 2018 13:00) (77 - 125)  BP: 144/88 (28 Aug 2018 13:00) (103/56 - 164/111)  BP(mean): 102 (28 Aug 2018 13:00) (66 - 126)  ABP: --  ABP(mean): --  RR: 22 (28 Aug 2018 13:00) (8 - 25)  SpO2: 98% (28 Aug 2018 13:00) (96% - 100%)        08-27 @ 07:01 - 08-28 @ 07:00  --------------------------------------------------------  IN: 1895 mL / OUT: 1100 mL / NET: 795 mL    08-28 @ 07:01 - 08-28 @ 13:07  --------------------------------------------------------  IN: 636 mL / OUT: 750 mL / NET: -114 mL      CAPILLARY BLOOD GLUCOSE      POCT Blood Glucose.: 170 mg/dL (28 Aug 2018 11:59)        Physical Exam: Appearance: Sitting in bed, NAD  HEENT:   Normal oral mucosa, PERRL, EOMI, anicteric sclera  Lymphatic: No lymphadenopathy in cervical nodes or inguinal nodes  Cardiovascular: tachycardic, regular rhythm, No murmurs, gallops or rubs appreciated  Respiratory: Lungs clear to auscultation bilaterally, no wheezes, crackles appreciated  Gastrointestinal:  Soft, obese, nondistended. Suprapubic tenderness to deep palpation. No inguinal or umbilical hernia noted.   Skin: No rashes, ecchymosis or cyanosis noted	  Neurologic: AOx3, CNII-XII grossly intact, motor and sensory function grossly intact  Extremities: Moving all extremities equally, no edema noted  Psych:  Normal mood and affect, responds to questions appropriately        MEDICATIONS:  MEDICATIONS  (STANDING):  dextrose 5% + sodium chloride 0.9%. 1000 milliLiter(s) (150 mL/Hr) IV Continuous <Continuous>  enoxaparin Injectable 40 milliGRAM(s) SubCutaneous daily  insulin Infusion 10 Unit(s)/Hr (10 mL/Hr) IV Continuous <Continuous>    MEDICATIONS  (PRN):  benzocaine 15 mG/menthol 3.6 mG Lozenge 1 Lozenge Oral four times a day PRN Sore Throat  HYDROmorphone  Injectable 0.5 milliGRAM(s) IV Push every 2 hours PRN Moderate to severe pain (4-10)      ALLERGIES:  Allergies    No Known Allergies    Intolerances        LABS:                        11.9   8.32  )-----------( 242      ( 28 Aug 2018 04:00 )             36.8     08-28    135  |  100  |  4<L>  ----------------------------<  218<H>  3.7   |  23  |  0.71    Ca    9.0      28 Aug 2018 09:35  Phos  3.0     08-28  Mg     1.8     08-28    TPro  6.4  /  Alb  3.3  /  TBili  0.5  /  DBili  x   /  AST  23  /  ALT  38  /  AlkPhos  52  08-28      Urinalysis Basic - ( 27 Aug 2018 11:20 )    Color: YELLOW / Appearance: CLEAR / SG: > 1.050 / pH: 6.5  Gluc: >1000 / Ketone: LARGE  / Bili: NEGATIVE / Urobili: NORMAL   Blood: TRACE / Protein: MODERATE / Nitrite: NEGATIVE   Leuk Esterase: NEGATIVE / RBC: 0-2 / WBC 0-2   Sq Epi: OCC / Non Sq Epi: x / Bacteria: NEGATIVE        RADIOLOGY & ADDITIONAL TESTS: Reviewed.

## 2018-08-28 NOTE — CONSULT NOTE ADULT - PROBLEM SELECTOR RECOMMENDATION 3
c/w insulin drip + D5W NS,   check TG q 12 h   FS q1 h  Diet as tolerates  switch to basal + bolus insulin when TG < 500  TENTATIVE PLAN FINAL PLAN TO BE DISCUSSED WITH ATTENDING c/w insulin drip + D5W NS,   check TG q 12 h   FS q1 h  Diet as tolerates  switch to basal + bolus insulin when TG < 500

## 2018-08-28 NOTE — CONSULT NOTE ADULT - PROBLEM SELECTOR RECOMMENDATION 2
c/w insulin drip for now when switch to basal + bolus pt will be candidate for oral fenofibrate Plus high intensity statin   TENTATIVE PLAN FINAL PLAN TO BE DISCUSSED WITH ATTENDING c/w insulin drip for now when switch to basal + bolus   -Will initiate on trior 145 mg daily  -Start lipitor 40 mg daily   -Start Omega 3- fatty acids ( Lovaza) 2 gm twice a day

## 2018-08-28 NOTE — CONSULT NOTE ADULT - SUBJECTIVE AND OBJECTIVE BOX
HPI:  38M PMH gout (intermittently takes advil and Cocrys) presenting with 5 days of abdominal pain.  Per pt, he was in [facility] until 8[place], when he started having pain in lower abdomen and epigastric area.  Per pt, he was in [place] for 1 week, returned on 8/23.  Pt stated he removed bags from overhead bin in airplane at which point he felt some pain in lower abdomen.  Pt states he thought it was a muscle strain, thought nothing of it.  The next day, pain continued in lower abdomen with sharp pain in epigastric region as well, pt states he started taking gas-x thinking it was gas pain, which helped a little.  Pain continued over the weekend.  Pt states he took advil which helped a little with the pain, however the pain did not go away.  Over the weekend, pain started going into the lower back.  This morning, at 5 am, pt started having sharp pain again in lower abdomen and took advil.  Pain did not resolve so patient came to ED for further evaluation. Pt had not had pain like this in the past.    In the ED, vital signs T: 98.8, , /97, RR 16, 100% RA.  Pt had CT scan showing mild peripancreatic stranding suggestive of acute pancreatitis, with triglycerides to 1800s.  MICU was consulted for high triglycerides causing pancreatitis.    Of note, patient states he had been drinking heaviliy in the last week in [place], up to 1/2 pint of [place] Walker daily with [place].  Pt states his mother has high triglycerides as well, never had pancreatitis.    PAST MEDICAL & SURGICAL HISTORY:  Gout    Home Medications:  None    Allergies  No Known Allergies    REVIEW OF SYSTEMS:    CONSTITUTIONAL: Endorses fevers and chills with pain.  EYES/ENT: No visual changes;  No vertigo or throat pain   NECK: No pain or stiffness  RESPIRATORY: No cough, wheezing, hemoptysis; No shortness of breath  CARDIOVASCULAR: No chest pain or palpitations  GASTROINTESTINAL: See HPI  GENITOURINARY: No dysuria, frequency or hematuria  NEUROLOGICAL: No numbness or weakness  SKIN: No itching, rashes    Vital Signs Last 24 Hrs  T(C): 37.1 (27 Aug 2018 16:09), Max: 37.1 (27 Aug 2018 08:57)  T(F): 98.7 (27 Aug 2018 16:09), Max: 98.8 (27 Aug 2018 08:57)  HR: 125 (27 Aug 2018 16:09) (100 - 125)  BP: 164/111 (27 Aug 2018 16:09) (141/97 - 164/111)  BP(mean): 126 (27 Aug 2018 16:09) (126 - 126)  RR: 17 (27 Aug 2018 16:09) (16 - 17)  SpO2: 100% (27 Aug 2018 16:09) (100% - 100%)    Appearance: Sitting in bed, NAD  HEENT:   Normal oral mucosa, PERRL, EOMI, anicteric sclera  Lymphatic: No lymphadenopathy in cervical nodes or inguinal nodes  Cardiovascular: tachycardic, regular rhythm, No murmurs, gallops or rubs appreciated  Respiratory: Lungs clear to auscultation bilaterally, no wheezes, crackles appreciated  Gastrointestinal:  Soft, nondistended, nontender; no inguinal or umbilical hernia noted  Skin: No rashes, eccymosis or cyanosis noted	  Neurologic: AOx3, CNII-XII grossly intact, motor and sensory function grossly intact  Extremities: Moving all extremities equally, no edema noted  Psych:  Normal mood and affect, responds to questions appropriately      Labs reviewed and below  08-27    133<L>  |  94<L>  |  9   ----------------------------<  295<H>  4.2   |  24  |  0.79    Ca    9.9      27 Aug 2018 11:20    TPro  8.0  /  Alb  3.9  /  TBili  0.6  /  DBili  x   /  AST  25  /  ALT  54<H>  /  AlkPhos  72  08-27                    Urinalysis Basic - ( 27 Aug 2018 11:20 )    Color: YELLOW / Appearance: CLEAR / SG: > 1.050 / pH: 6.5  Gluc: >1000 / Ketone: LARGE  / Bili: NEGATIVE / Urobili: NORMAL   Blood: TRACE / Protein: MODERATE / Nitrite: NEGATIVE   Leuk Esterase: NEGATIVE / RBC: 0-2 / WBC 0-2   Sq Epi: OCC / Non Sq Epi: x / Bacteria: NEGATIVE                              14.3   10.40 )-----------( 261      ( 27 Aug 2018 11:20 )             44.0     CAPILLARY BLOOD GLUCOSE      CT scan reviewed showing mild pancreatitis and hepatic steatosis.  US reviewed showing hepatic steatosis
HPI:  38M PMH gout (intermittently takes advil and Cocrys) presenting with 5 days of abdominal pain.  Per pt, he was [place] until [place], when he started having pain in lower abdomen and epigastric area.  Per pt, he was in [place] for 1 week, returned on 8/23.  Pt stated he removed bags from overhead bin in airplane at which point he felt some pain in lower abdomen.  Pt states he thought it was a muscle strain, thought nothing of it.  The next day, pain continued in lower abdomen with sharp pain in epigastric region as well, pt states he started taking gas-x thinking it was gas pain, which helped a little.  Pain continued over the weekend.  Pt states he took Advil which helped a little with the pain, however the pain did not go away.  Over the weekend, pain started going into the lower back.  This morning, at 5 am, pt started having sharp pain again in lower abdomen and took Advil  Pain did not resolve so patient came to ED for further evaluation. Pt had not had pain like this in the past.In the ED, vital signs T: 98.8, , /97, RR 16, 100% RA.  Pt had CT scan showing mild peripancreatic stranding suggestive of acute pancreatitis, with triglycerides to 1800s.  MICU was consulted for high triglycerides causing pancreatitis.  Of note, patient states he had been drinking heavily in the last week in [place], up to 1/2 pint of [place] Walker daily with [place]  Pt states his mother has high triglycerides as well, never had pancreatitis. (27 Aug 2018 17:33)    Endocrine Hx  38M PMH gout (intermittently takes advil and Cocrys) presenting with 5 days of abdominal pain in RLQ admitted to MICU for acute pancreatitis 2/2 hypertriglyceridemia. Pt was seen by endocrine service for management of new Dx of his T2DM and acute pancreatitis 2/2 Hypertriglyceridemia. Pt denies any hx of diabetes sx like polyuria, polydipsia, visual blurriness. He [place] and was drinking ETOH and soda and eating cakes and cookies all the time.  No eye problems, No tingling and numbness in extremities, No known kidney or heart problems. No pancreas diseases or surgeries in past. No autoimmune disease. No olanzapine or steroid or immunosuppressant use. He lost 40 pounds of weight in last year, does not do exercise     PAST MEDICAL & SURGICAL HISTORY:  Gout  No significant past surgical history      FAMILY HISTORY:  Family history of endogenous hypertriglyceridemia (Mother)  Uncle and mother - DM    Social History:  Tobacco- once a month  ETOH- socially but recently he was drinking a lot while he was on vacation   Illicits -no use      MEDICATIONS  (STANDING):  dextrose 5% + sodium chloride 0.9%. 1000 milliLiter(s) (150 mL/Hr) IV Continuous <Continuous>  enoxaparin Injectable 40 milliGRAM(s) SubCutaneous daily  insulin Infusion 10 Unit(s)/Hr (10 mL/Hr) IV Continuous <Continuous>    MEDICATIONS  (PRN):  benzocaine 15 mG/menthol 3.6 mG Lozenge 1 Lozenge Oral four times a day PRN Sore Throat  HYDROmorphone  Injectable 0.5 milliGRAM(s) IV Push every 2 hours PRN Moderate to severe pain (4-10)      Allergies    No Known Allergies    Intolerances      Review of Systems:  Constitutional: No fever, good appetite/po intake  Eyes: No blurry vision, diplopia  Neuro: No tremors  HEENT: No pain  Cardiovascular: No chest pain, palpitations  Respiratory: No SOB, no cough  GI: No nausea, vomiting, + RLQ pain   : No dysuria, hematuria  Skin: no rash  Psych: no depression  Endocrine: no polyuria, polydipsia  Hem/lymph: no swelling  Osteoporosis: no fractures    ALL OTHER SYSTEMS REVIEWED AND NEGATIVE      PHYSICAL EXAM:  VITALS: T(C): 37.5 (08-28-18 @ 12:00)  T(F): 99.5 (08-28-18 @ 12:00), Max: 99.6 (08-27-18 @ 20:00)  HR: 77 (08-28-18 @ 15:00) (77 - 125)  BP: 140/87 (08-28-18 @ 15:00) (103/56 - 164/111)  RR:  (8 - 25)  SpO2:  (96% - 100%)  Wt(kg): --  GENERAL: NAD, well-groomed, well-developed  EYES: No proptosis, no lid lag, anicteric  HEENT:  Atraumatic, Normocephalic, moist mucous membranes  THYROID: Normal size, no palpable nodules  RESPIRATORY: Clear to auscultation bilaterally; No rales, rhonchi, wheezing, or rubs  CARDIOVASCULAR: Regular rate and rhythm; No murmurs; no peripheral edema  GI: Soft, RLQ tenderness, non distended, normal bowel sounds  SKIN: Dry, intact, No rashes or lesions  NEURO: sensation intact, extraocular movements intact, no tremor, normal reflexes  PSYCH: reactive affect, euthymic mood  CUSHING'S SIGNS: no striae    POCT Blood Glucose.: 171 mg/dL (08-28-18 @ 15:06)  POCT Blood Glucose.: 205 mg/dL (08-28-18 @ 13:58)  POCT Blood Glucose.: 244 mg/dL (08-28-18 @ 13:09)  POCT Blood Glucose.: 170 mg/dL (08-28-18 @ 11:59)  POCT Blood Glucose.: 187 mg/dL (08-28-18 @ 11:11)  POCT Blood Glucose.: 217 mg/dL (08-28-18 @ 10:05)  POCT Blood Glucose.: 169 mg/dL (08-28-18 @ 09:06)  POCT Blood Glucose.: 120 mg/dL (08-28-18 @ 08:07)  POCT Blood Glucose.: 132 mg/dL (08-28-18 @ 06:58)  POCT Blood Glucose.: 135 mg/dL (08-28-18 @ 05:58)  POCT Blood Glucose.: 144 mg/dL (08-28-18 @ 04:58)  POCT Blood Glucose.: 157 mg/dL (08-28-18 @ 04:04)  POCT Blood Glucose.: 96 mg/dL (08-28-18 @ 03:00)  POCT Blood Glucose.: 115 mg/dL (08-28-18 @ 02:00)  POCT Blood Glucose.: 123 mg/dL (08-28-18 @ 01:05)  POCT Blood Glucose.: 139 mg/dL (08-28-18 @ 00:03)  POCT Blood Glucose.: 192 mg/dL (08-27-18 @ 23:04)  POCT Blood Glucose.: 122 mg/dL (08-27-18 @ 21:34)  POCT Blood Glucose.: 146 mg/dL (08-27-18 @ 20:35)  POCT Blood Glucose.: 180 mg/dL (08-27-18 @ 19:30)  POCT Blood Glucose.: 213 mg/dL (08-27-18 @ 18:32)  POCT Blood Glucose.: 253 mg/dL (08-27-18 @ 17:41)                            11.9   8.32  )-----------( 242      ( 28 Aug 2018 04:00 )             36.8       08-28    135  |  100  |  4<L>  ----------------------------<  218<H>  3.7   |  23  |  0.71    EGFR if : 139  EGFR if non : 120    Ca    9.0      08-28  Mg     1.8     08-28  Phos  3.0     08-28    TPro  6.4  /  Alb  3.3  /  TBili  0.5  /  DBili  x   /  AST  23  /  ALT  38  /  AlkPhos  52  08-28      Thyroid Function Tests:      Hemoglobin A1C, Whole Blood: 10.3 % <H> [4.0 - 5.6] (08-28-18 @ 04:00)      08-28 Chol 260<H> LDL 59 HDL 13<L> Trig 1053<H>, 08-27 Chol -- LDL -- HDL -- Trig 1462<H>, 08-27 Chol -- LDL -- HDL -- Trig 1870<H>

## 2018-08-29 LAB
BASOPHILS # BLD AUTO: 0.04 K/UL — SIGNIFICANT CHANGE UP (ref 0–0.2)
BASOPHILS NFR BLD AUTO: 0.6 % — SIGNIFICANT CHANGE UP (ref 0–2)
BUN SERPL-MCNC: 4 MG/DL — LOW (ref 7–23)
BUN SERPL-MCNC: 5 MG/DL — LOW (ref 7–23)
C PEPTIDE SERPL-MCNC: 0.5 NG/ML — LOW (ref 0.9–7.1)
CALCIUM SERPL-MCNC: 8.9 MG/DL — SIGNIFICANT CHANGE UP (ref 8.4–10.5)
CALCIUM SERPL-MCNC: 9.3 MG/DL — SIGNIFICANT CHANGE UP (ref 8.4–10.5)
CHLORIDE SERPL-SCNC: 100 MMOL/L — SIGNIFICANT CHANGE UP (ref 98–107)
CHLORIDE SERPL-SCNC: 103 MMOL/L — SIGNIFICANT CHANGE UP (ref 98–107)
CO2 SERPL-SCNC: 22 MMOL/L — SIGNIFICANT CHANGE UP (ref 22–31)
CO2 SERPL-SCNC: 25 MMOL/L — SIGNIFICANT CHANGE UP (ref 22–31)
CREAT SERPL-MCNC: 0.73 MG/DL — SIGNIFICANT CHANGE UP (ref 0.5–1.3)
CREAT SERPL-MCNC: 0.87 MG/DL — SIGNIFICANT CHANGE UP (ref 0.5–1.3)
EOSINOPHIL # BLD AUTO: 0.24 K/UL — SIGNIFICANT CHANGE UP (ref 0–0.5)
EOSINOPHIL NFR BLD AUTO: 3.5 % — SIGNIFICANT CHANGE UP (ref 0–6)
GLUCOSE BLDC GLUCOMTR-MCNC: 105 MG/DL — HIGH (ref 70–99)
GLUCOSE BLDC GLUCOMTR-MCNC: 107 MG/DL — HIGH (ref 70–99)
GLUCOSE BLDC GLUCOMTR-MCNC: 114 MG/DL — HIGH (ref 70–99)
GLUCOSE BLDC GLUCOMTR-MCNC: 116 MG/DL — HIGH (ref 70–99)
GLUCOSE BLDC GLUCOMTR-MCNC: 140 MG/DL — HIGH (ref 70–99)
GLUCOSE BLDC GLUCOMTR-MCNC: 141 MG/DL — HIGH (ref 70–99)
GLUCOSE BLDC GLUCOMTR-MCNC: 148 MG/DL — HIGH (ref 70–99)
GLUCOSE BLDC GLUCOMTR-MCNC: 189 MG/DL — HIGH (ref 70–99)
GLUCOSE BLDC GLUCOMTR-MCNC: 204 MG/DL — HIGH (ref 70–99)
GLUCOSE BLDC GLUCOMTR-MCNC: 253 MG/DL — HIGH (ref 70–99)
GLUCOSE BLDC GLUCOMTR-MCNC: 96 MG/DL — SIGNIFICANT CHANGE UP (ref 70–99)
GLUCOSE SERPL-MCNC: 111 MG/DL — HIGH (ref 70–99)
GLUCOSE SERPL-MCNC: 217 MG/DL — HIGH (ref 70–99)
HCT VFR BLD CALC: 37.4 % — LOW (ref 39–50)
HGB BLD-MCNC: 12.2 G/DL — LOW (ref 13–17)
IMM GRANULOCYTES # BLD AUTO: 0.04 # — SIGNIFICANT CHANGE UP
IMM GRANULOCYTES NFR BLD AUTO: 0.6 % — SIGNIFICANT CHANGE UP (ref 0–1.5)
LYMPHOCYTES # BLD AUTO: 2.51 K/UL — SIGNIFICANT CHANGE UP (ref 1–3.3)
LYMPHOCYTES # BLD AUTO: 37.1 % — SIGNIFICANT CHANGE UP (ref 13–44)
MAGNESIUM SERPL-MCNC: 1.6 MG/DL — SIGNIFICANT CHANGE UP (ref 1.6–2.6)
MAGNESIUM SERPL-MCNC: 1.8 MG/DL — SIGNIFICANT CHANGE UP (ref 1.6–2.6)
MCHC RBC-ENTMCNC: 28.9 PG — SIGNIFICANT CHANGE UP (ref 27–34)
MCHC RBC-ENTMCNC: 32.6 % — SIGNIFICANT CHANGE UP (ref 32–36)
MCV RBC AUTO: 88.6 FL — SIGNIFICANT CHANGE UP (ref 80–100)
MONOCYTES # BLD AUTO: 0.63 K/UL — SIGNIFICANT CHANGE UP (ref 0–0.9)
MONOCYTES NFR BLD AUTO: 9.3 % — SIGNIFICANT CHANGE UP (ref 2–14)
NEUTROPHILS # BLD AUTO: 3.31 K/UL — SIGNIFICANT CHANGE UP (ref 1.8–7.4)
NEUTROPHILS NFR BLD AUTO: 48.9 % — SIGNIFICANT CHANGE UP (ref 43–77)
NRBC # FLD: 0 — SIGNIFICANT CHANGE UP
PHOSPHATE SERPL-MCNC: 3.2 MG/DL — SIGNIFICANT CHANGE UP (ref 2.5–4.5)
PHOSPHATE SERPL-MCNC: 4 MG/DL — SIGNIFICANT CHANGE UP (ref 2.5–4.5)
PLATELET # BLD AUTO: 248 K/UL — SIGNIFICANT CHANGE UP (ref 150–400)
PMV BLD: 10.1 FL — SIGNIFICANT CHANGE UP (ref 7–13)
POTASSIUM SERPL-MCNC: 3.8 MMOL/L — SIGNIFICANT CHANGE UP (ref 3.5–5.3)
POTASSIUM SERPL-MCNC: 4.2 MMOL/L — SIGNIFICANT CHANGE UP (ref 3.5–5.3)
POTASSIUM SERPL-SCNC: 3.8 MMOL/L — SIGNIFICANT CHANGE UP (ref 3.5–5.3)
POTASSIUM SERPL-SCNC: 4.2 MMOL/L — SIGNIFICANT CHANGE UP (ref 3.5–5.3)
RBC # BLD: 4.22 M/UL — SIGNIFICANT CHANGE UP (ref 4.2–5.8)
RBC # FLD: 13.2 % — SIGNIFICANT CHANGE UP (ref 10.3–14.5)
SODIUM SERPL-SCNC: 136 MMOL/L — SIGNIFICANT CHANGE UP (ref 135–145)
SODIUM SERPL-SCNC: 139 MMOL/L — SIGNIFICANT CHANGE UP (ref 135–145)
TRIGL SERPL-MCNC: 419 MG/DL — HIGH (ref 10–149)
TRIGL SERPL-MCNC: 549 MG/DL — HIGH (ref 10–149)
WBC # BLD: 6.77 K/UL — SIGNIFICANT CHANGE UP (ref 3.8–10.5)
WBC # FLD AUTO: 6.77 K/UL — SIGNIFICANT CHANGE UP (ref 3.8–10.5)

## 2018-08-29 PROCEDURE — 99233 SBSQ HOSP IP/OBS HIGH 50: CPT | Mod: GC

## 2018-08-29 RX ORDER — INSULIN GLARGINE 100 [IU]/ML
20 INJECTION, SOLUTION SUBCUTANEOUS EVERY MORNING
Qty: 0 | Refills: 0 | Status: DISCONTINUED | OUTPATIENT
Start: 2018-08-29 | End: 2018-08-30

## 2018-08-29 RX ORDER — INSULIN LISPRO 100/ML
VIAL (ML) SUBCUTANEOUS
Qty: 0 | Refills: 0 | Status: DISCONTINUED | OUTPATIENT
Start: 2018-08-29 | End: 2018-08-30

## 2018-08-29 RX ORDER — MAGNESIUM SULFATE 500 MG/ML
2 VIAL (ML) INJECTION ONCE
Qty: 0 | Refills: 0 | Status: DISCONTINUED | OUTPATIENT
Start: 2018-08-29 | End: 2018-08-29

## 2018-08-29 RX ORDER — INSULIN LISPRO 100/ML
6 VIAL (ML) SUBCUTANEOUS
Qty: 0 | Refills: 0 | Status: DISCONTINUED | OUTPATIENT
Start: 2018-08-29 | End: 2018-08-30

## 2018-08-29 RX ORDER — INSULIN LISPRO 100/ML
VIAL (ML) SUBCUTANEOUS AT BEDTIME
Qty: 0 | Refills: 0 | Status: DISCONTINUED | OUTPATIENT
Start: 2018-08-29 | End: 2018-08-30

## 2018-08-29 RX ORDER — LISINOPRIL 2.5 MG/1
2.5 TABLET ORAL DAILY
Qty: 0 | Refills: 0 | Status: DISCONTINUED | OUTPATIENT
Start: 2018-08-29 | End: 2018-08-30

## 2018-08-29 RX ORDER — SODIUM CHLORIDE 9 MG/ML
250 INJECTION INTRAMUSCULAR; INTRAVENOUS; SUBCUTANEOUS ONCE
Qty: 0 | Refills: 0 | Status: DISCONTINUED | OUTPATIENT
Start: 2018-08-29 | End: 2018-08-29

## 2018-08-29 RX ADMIN — CHLORHEXIDINE GLUCONATE 1 APPLICATION(S): 213 SOLUTION TOPICAL at 11:34

## 2018-08-29 RX ADMIN — LISINOPRIL 2.5 MILLIGRAM(S): 2.5 TABLET ORAL at 15:44

## 2018-08-29 RX ADMIN — INSULIN GLARGINE 20 UNIT(S): 100 INJECTION, SOLUTION SUBCUTANEOUS at 08:30

## 2018-08-29 RX ADMIN — HYDROMORPHONE HYDROCHLORIDE 1 MILLIGRAM(S): 2 INJECTION INTRAMUSCULAR; INTRAVENOUS; SUBCUTANEOUS at 16:38

## 2018-08-29 RX ADMIN — Medication 2: at 15:47

## 2018-08-29 RX ADMIN — Medication 2: at 11:38

## 2018-08-29 RX ADMIN — Medication 2 GRAM(S): at 11:34

## 2018-08-29 RX ADMIN — ENOXAPARIN SODIUM 40 MILLIGRAM(S): 100 INJECTION SUBCUTANEOUS at 11:34

## 2018-08-29 RX ADMIN — HYDROMORPHONE HYDROCHLORIDE 1 MILLIGRAM(S): 2 INJECTION INTRAMUSCULAR; INTRAVENOUS; SUBCUTANEOUS at 08:15

## 2018-08-29 RX ADMIN — HYDROMORPHONE HYDROCHLORIDE 1 MILLIGRAM(S): 2 INJECTION INTRAMUSCULAR; INTRAVENOUS; SUBCUTANEOUS at 21:33

## 2018-08-29 RX ADMIN — ATORVASTATIN CALCIUM 40 MILLIGRAM(S): 80 TABLET, FILM COATED ORAL at 21:27

## 2018-08-29 RX ADMIN — Medication 6 UNIT(S): at 18:39

## 2018-08-29 RX ADMIN — HYDROMORPHONE HYDROCHLORIDE 1 MILLIGRAM(S): 2 INJECTION INTRAMUSCULAR; INTRAVENOUS; SUBCUTANEOUS at 19:31

## 2018-08-29 RX ADMIN — Medication 2 GRAM(S): at 21:28

## 2018-08-29 RX ADMIN — HYDROMORPHONE HYDROCHLORIDE 1 MILLIGRAM(S): 2 INJECTION INTRAMUSCULAR; INTRAVENOUS; SUBCUTANEOUS at 17:00

## 2018-08-29 RX ADMIN — Medication 145 MILLIGRAM(S): at 21:28

## 2018-08-29 RX ADMIN — HYDROMORPHONE HYDROCHLORIDE 1 MILLIGRAM(S): 2 INJECTION INTRAMUSCULAR; INTRAVENOUS; SUBCUTANEOUS at 12:16

## 2018-08-29 RX ADMIN — HYDROMORPHONE HYDROCHLORIDE 1 MILLIGRAM(S): 2 INJECTION INTRAMUSCULAR; INTRAVENOUS; SUBCUTANEOUS at 01:15

## 2018-08-29 RX ADMIN — HYDROMORPHONE HYDROCHLORIDE 1 MILLIGRAM(S): 2 INJECTION INTRAMUSCULAR; INTRAVENOUS; SUBCUTANEOUS at 19:45

## 2018-08-29 RX ADMIN — HYDROMORPHONE HYDROCHLORIDE 1 MILLIGRAM(S): 2 INJECTION INTRAMUSCULAR; INTRAVENOUS; SUBCUTANEOUS at 12:40

## 2018-08-29 RX ADMIN — HYDROMORPHONE HYDROCHLORIDE 1 MILLIGRAM(S): 2 INJECTION INTRAMUSCULAR; INTRAVENOUS; SUBCUTANEOUS at 08:40

## 2018-08-29 RX ADMIN — HYDROMORPHONE HYDROCHLORIDE 1 MILLIGRAM(S): 2 INJECTION INTRAMUSCULAR; INTRAVENOUS; SUBCUTANEOUS at 02:00

## 2018-08-29 RX ADMIN — HYDROMORPHONE HYDROCHLORIDE 1 MILLIGRAM(S): 2 INJECTION INTRAMUSCULAR; INTRAVENOUS; SUBCUTANEOUS at 21:47

## 2018-08-29 NOTE — PROGRESS NOTE ADULT - PROBLEM SELECTOR PLAN 1
T1DM vs undiagnosed T2DM   While inpatient - c/w basal + bolus regimen, 20 units lantus every morning and 6 units humalog before meals   low carb diet as tolerates  Goal HbA1c- 6.5-7.0, recent HbA1c -10.3  Goal -180 while inpatient  new dx Diabetes education   Nutrition consult  will check Anti LIANET Abs and Anti islet cell Abs  Upon dc - pt will continue with basal + bolus regimen vs oral + basal plan , initiated discussion  Pt will follow up with our o/p location [address]([phone]). T1DM vs undiagnosed T2DM   While inpatient - c/w basal + bolus regimen, 20 units lantus every morning and 6 units humalog before meals plus moderate scale  low carb diet as tolerates  Goal HbA1c- 6.5-7.0, recent HbA1c -10.3  Goal -180 while inpatient  new dx Diabetes education   Nutrition consult  will check Anti LIANET Abs and Anti islet cell Abs  Upon dc - pt will continue with basal + bolus regimen vs oral + basal plan , initiated discussion  Pt will follow up with our o/p location [address]([phone]).

## 2018-08-29 NOTE — PROGRESS NOTE ADULT - PROBLEM SELECTOR PLAN 2
c/w on trior 145 mg daily  c/w lipitor 40 mg daily   c/w  Omega 3- fatty acids ( Lovaza) 2 gm twice a day. c/w on tricor 145 mg daily  c/w lipitor 40 mg daily   c/w  Omega 3- fatty acids ( Lovaza) 2 gm twice a day.

## 2018-08-29 NOTE — PROGRESS NOTE ADULT - SUBJECTIVE AND OBJECTIVE BOX
Chief Complaint/Follow-up on:   New diagnosis of DM, acute pancreatitis 2/2 Hypertriglyceridemia    Subjective:  Pt was seen and examined at bedside, pt TG improved now < 500 , off insulin drip. Pt still has mild RLQ pain.     MEDICATIONS  (STANDING):  atorvastatin 40 milliGRAM(s) Oral at bedtime  enoxaparin Injectable 40 milliGRAM(s) SubCutaneous daily  fenofibrate Tablet 145 milliGRAM(s) Oral at bedtime  insulin glargine Injectable (LANTUS) 20 Unit(s) SubCutaneous every morning  insulin lispro (HumaLOG) corrective regimen sliding scale   SubCutaneous three times a day before meals  insulin lispro (HumaLOG) corrective regimen sliding scale   SubCutaneous at bedtime  lisinopril 2.5 milliGRAM(s) Oral daily  omega-3-Acid Ethyl Esters 2 Gram(s) Oral two times a day    MEDICATIONS  (PRN):  benzocaine 15 mG/menthol 3.6 mG Lozenge 1 Lozenge Oral four times a day PRN Sore Throat  HYDROmorphone  Injectable 1 milliGRAM(s) IV Push every 2 hours PRN Severe Pain (7 - 10)      PHYSICAL EXAM:  VITALS: T(C): 36.1 (08-29-18 @ 15:30)  T(F): 96.9 (08-29-18 @ 15:30), Max: 99.2 (08-28-18 @ 20:00)  HR: 89 (08-29-18 @ 15:30) (71 - 113)  BP: 144/98 (08-29-18 @ 15:30) (131/84 - 164/116)  RR:  (13 - 23)  SpO2:  (95% - 100%)  Wt(kg): --  GENERAL: NAD, well-groomed, well-developed  EYES: No proptosis, no injection  HEENT:  Atraumatic, Normocephalic, moist mucous membranes  THYROID: Normal size, no palpable nodules  RESPIRATORY: Clear to auscultation bilaterally; No rales, rhonchi, wheezing, or rubs  CARDIOVASCULAR: Regular rate and rhythm; No murmurs; no peripheral edema  GI: Soft, nontender, non distended, normal bowel sounds  CUSHING'S SIGNS: no striae    POCT Blood Glucose.: 189 mg/dL (08-29-18 @ 11:36)  POCT Blood Glucose.: 253 mg/dL (08-29-18 @ 08:29)  POCT Blood Glucose.: 96 mg/dL (08-29-18 @ 07:00)  POCT Blood Glucose.: 107 mg/dL (08-29-18 @ 06:15)  POCT Blood Glucose.: 105 mg/dL (08-29-18 @ 05:07)  POCT Blood Glucose.: 114 mg/dL (08-29-18 @ 04:15)  POCT Blood Glucose.: 116 mg/dL (08-29-18 @ 03:15)  POCT Blood Glucose.: 140 mg/dL (08-29-18 @ 02:08)  POCT Blood Glucose.: 141 mg/dL (08-29-18 @ 01:08)  POCT Blood Glucose.: 148 mg/dL (08-29-18 @ 00:13)  POCT Blood Glucose.: 169 mg/dL (08-28-18 @ 23:02)  POCT Blood Glucose.: 159 mg/dL (08-28-18 @ 22:14)  POCT Blood Glucose.: 144 mg/dL (08-28-18 @ 21:06)  POCT Blood Glucose.: 114 mg/dL (08-28-18 @ 20:06)  POCT Blood Glucose.: 113 mg/dL (08-28-18 @ 19:10)  POCT Blood Glucose.: 107 mg/dL (08-28-18 @ 18:09)  POCT Blood Glucose.: 118 mg/dL (08-28-18 @ 17:04)  POCT Blood Glucose.: 119 mg/dL (08-28-18 @ 17:01)  POCT Blood Glucose.: 151 mg/dL (08-28-18 @ 16:14)  POCT Blood Glucose.: 171 mg/dL (08-28-18 @ 15:06)  POCT Blood Glucose.: 205 mg/dL (08-28-18 @ 13:58)  POCT Blood Glucose.: 244 mg/dL (08-28-18 @ 13:09)  POCT Blood Glucose.: 170 mg/dL (08-28-18 @ 11:59)  POCT Blood Glucose.: 187 mg/dL (08-28-18 @ 11:11)  POCT Blood Glucose.: 217 mg/dL (08-28-18 @ 10:05)  POCT Blood Glucose.: 169 mg/dL (08-28-18 @ 09:06)  POCT Blood Glucose.: 120 mg/dL (08-28-18 @ 08:07)  POCT Blood Glucose.: 132 mg/dL (08-28-18 @ 06:58)  POCT Blood Glucose.: 135 mg/dL (08-28-18 @ 05:58)  POCT Blood Glucose.: 144 mg/dL (08-28-18 @ 04:58)  POCT Blood Glucose.: 157 mg/dL (08-28-18 @ 04:04)  POCT Blood Glucose.: 96 mg/dL (08-28-18 @ 03:00)  POCT Blood Glucose.: 115 mg/dL (08-28-18 @ 02:00)  POCT Blood Glucose.: 123 mg/dL (08-28-18 @ 01:05)  POCT Blood Glucose.: 139 mg/dL (08-28-18 @ 00:03)  POCT Blood Glucose.: 192 mg/dL (08-27-18 @ 23:04)  POCT Blood Glucose.: 122 mg/dL (08-27-18 @ 21:34)  POCT Blood Glucose.: 146 mg/dL (08-27-18 @ 20:35)  POCT Blood Glucose.: 180 mg/dL (08-27-18 @ 19:30)  POCT Blood Glucose.: 213 mg/dL (08-27-18 @ 18:32)  POCT Blood Glucose.: 253 mg/dL (08-27-18 @ 17:41)    08-29    136  |  100  |  4<L>  ----------------------------<  217<H>  4.2   |  22  |  0.73    EGFR if : 137  EGFR if non : 119    Ca    9.3      08-29  Mg     1.6     08-29  Phos  3.2     08-29    TPro  6.4  /  Alb  3.3  /  TBili  0.5  /  DBili  x   /  AST  23  /  ALT  38  /  AlkPhos  52  08-28          Thyroid Function Tests:      Hemoglobin A1C, Whole Blood: 10.3 % <H> [4.0 - 5.6] (08-28-18 @ 04:00)

## 2018-08-29 NOTE — DIETITIAN INITIAL EVALUATION ADULT. - NS AS NUTRI INTERV ED CONTENT
Purpose of the nutrition education/Survival information/Recommended modifications/Priority modifications/Nutrition relationship to health/disease

## 2018-08-29 NOTE — DIETITIAN INITIAL EVALUATION ADULT. - NS AS NUTRI INTERV COLLABORAT
1)continue consistent carbohydrate w evening snack DASH/TLC (cholesterol & Na restricted) diet.             2)obtain weekly weights                        3)RDN remains available.  Nubia Torres RDN, CDN  pager [phone]

## 2018-08-29 NOTE — DIETITIAN INITIAL EVALUATION ADULT. - OTHER INFO
Pt. reports good appetite/PO intake @ denies food allergies, nausea/vomiting/diarrhea/constipation, or issues with chewing/swallowing.  Pt. w Dx of new DM.  RDN provided extensive verbal and printed education re: therapeutic diet, including consistent carbohydrate & heart healthy & low fat food intake.   Explained risks associated with suboptimal glycemic control.  Encouraged self glucose monitoring, & outpatient endo.  follow up,  Also advised on hypoglycemia prevention/management.  Pt. minimally receptive to education and does not ask questions.

## 2018-08-29 NOTE — CHART NOTE - NSCHARTNOTEFT_GEN_A_CORE
MICU Transfer Note    HPI:  38M PMH gout p/w abdominal pain admitted to MICU for acute pancreatitis 2.2 to hypertriglyceridemia. Patient had triglycerides of 1870 with CT abdomen showing mild peripancreatic stranding consistent with acute pancreatitis. However patient had lipase of 56.5 and never had epigastric pain. Despite not presenting with the typical pancreatitis picture patient was treated accordingly. Labs also significant for Hgb A1c of 10.3 consistent with new diagnosis of diabetes. Endocrine was consulted. He was started on lipitor 40mg qHs, fenofibrate 145 qHs, and omega 3 2g BID.     Of note patient has also been complaining of constant 7/10 - 10/10 suprapubic pain radiating to the lower back. Worse when bladder is full, better after urinating. Patient reports having only one sexual partner, his girlfriend, and denies history of STDs. UA was negative for pyuria. Physical exam showed a soft, suprapubic tenderness to deep palpation, nondistended obese abdomen. CT scan shows "RETROPERITONEUM: No lymphadenopathy. Mild amount of fluid tracking along   the right retroperitoneal space." Patient has been afebrile during his stay.     TO DO:   [] continue with lipitor and fenofibrate. Continue to trend lipids  [ ]Follow up with endocrine recs for new diagnosis of diabetes, will need diabetic teaching and supplies   [ ] Patient has been hypertensive during his stay (systolics 140s-150s), titrate lisinopril for goal BP <130  [ ] continue with Dilaudid for pain control, will need workup of suprapubic pain    Chano Samuel, PGY3  MAR

## 2018-08-29 NOTE — DIETITIAN INITIAL EVALUATION ADULT. - PERTINENT LABORATORY DATA
08-29 Na139 mmol/L Glu 111 mg/dL<H> K+ 3.8 mmol/L Cr  0.87 mg/dL BUN 5 mg/dL<L> 08-29 Phos 4.0 mg/dL 08-28 Alb 3.3 g/dL 08-28 [id number] 10.3 %<H> 08-29 Chol --    LDL --    HDL --    Trig 549 mg/dL<H>

## 2018-08-29 NOTE — CHART NOTE - NSCHARTNOTEFT_GEN_A_CORE
39yo man with hx gout presents from the MICU with suprapubic abdominal pain.  On presentation he had lower quadrant abdominal pain and CT showed pancreatitis.  However, he did not have epigastric pain and his lipase was not markedly elevated.  He did have elevated triglycerides to the 1800s and endorse drinking heavily one week prior to admission so he was treated for presumed pancreatitis with supportive care.  His abdominal pain is still persistent concentrated in the right suprapubic region with associated back pain.  On presentation he also had elevated sugars was found to have a hemoglobin A1c of 10.3.  He previously had never been diagnosed with diabetes.  Endocrine is following the patient and his blood sugars have ranged from around 150 to 200.  For the elevated triglycerides the patient was started on lipitor, fenofibrate, and Omega 3 with downtrend in the triglycerides to around 400.    On further questioning he does endorse having one sexual partner in the last year but no penile drainage or suprapubic pain before this.  He does not use protection when having sex.  He does say the pain gives him the sensation to urinate.  He has required round the clock diluadid for pain control.  He does not have dysuria or increased urinary frequency, and his U/a was negative.  His CT scan did show drainage down the retroperitoneum on the right side where he is having pain so this may be a cause of the pain as well.    Vital Signs Last 24 Hrs  T(C): 36.8 (29 Aug 2018 16:35), Max: 37.3 (28 Aug 2018 20:00)  T(F): 98.3 (29 Aug 2018 16:35), Max: 99.2 (28 Aug 2018 20:00)  HR: 93 (29 Aug 2018 18:15) (71 - 113)  BP: 157/113 (29 Aug 2018 18:15) (131/84 - 164/116)  BP(mean): 112 (29 Aug 2018 15:30) (93 - 125)  RR: 16 (29 Aug 2018 16:35) (13 - 23)  SpO2: 100% (29 Aug 2018 16:35) (95% - 100%)    PHYSICAL EXAM:  GENERAL: NAD, well-developed  HEAD:  Atraumatic, Normocephalic  EYES: EOMI, PERRLA, conjunctiva and sclera clear  NECK: Supple, No JVD  CHEST/LUNG: Clear to auscultation bilaterally; No wheeze  HEART: Regular rate and rhythm; No murmurs, rubs, or gallops  ABDOMEN: Soft, Nontender, Nondistended; Bowel sounds present. Tenderness in lower down in right pubic region  EXTREMITIES:  2+ Peripheral Pulses, No clubbing, cyanosis, or edema  PSYCH: AAOx3  NEUROLOGY: non-focal  SKIN: No rashes or lesions    #Suprapubic pain  -Continue with dilaudid for pain control. Will discuss further work up in AM    #Diabetes  -Following endocrine recs on insulin (lantus 20, humalog 6)    #HTN  -On lisinopril 2.5mg Qday    #HLD  -Continue lipitor, fenofibrate, omega 3    Chad Russell PGY 1 [phone]

## 2018-08-29 NOTE — CHART NOTE - NSCHARTNOTEFT_GEN_A_CORE
MICU Transfer Note    Transfer from: MICU  Transfer to:  (X) Medicine    (  ) Telemetry    (  ) RCU    (  ) Palliative    (  ) Stroke Unit    (  ) _______________  Accepting physician:     HPI:  38M PMH gout (intermittently takes advil and Cocrys) presenting with 5 days of abdominal pain.  Per pt, he was in [place] until 8/[place], when he started having pain in lower abdomen.  Per pt, he was in [place] for 1 week, returned on 8/23.  Pt stated he removed bags from overhead bin in airplane at which point he felt some pain in lower abdomen.  Pt states he thought it was a muscle strain, thought nothing of it.  The next day, pain continued in lower abdomen, pt states he started taking gas-x thinking it was gas pain, which helped a little.  Pain continued over the weekend.  Pt states he took Advil which helped a little with the pain, however the pain did not go away.  Over the weekend, pain started also having midline lower back.  This morning, at 5 am, pt started having sharp pain again in lower abdomen and took Advil  Pain did not resolve so patient came to ED for further evaluation. Patient never had a similar pain before.     In the ED, vital signs T: 98.8, , /97, RR 16, 100% RA.  Pt had CT scan showing mild peripancreatic stranding suggestive of acute pancreatitis, with triglycerides to 1800s.  MICU was consulted for high triglycerides causing pancreatitis.    Of note, patient states he had been drinking heavily in the last week in [place], up to 1/2 pint of Gabriel Walker daily with [employer]  Pt states his mother has high triglycerides as well, never had pancreatitis.    MICU COURSE: Patient was bought into the MICU for suspected pancreatitis. Patient had triglycerides of 1870 with CT abdomen showing mild peripancreatic stranding consistent with acute pancreatitis. However patient had lipase of 56.5 and never had epigastric pain. Despite not presenting with the typical pancreatitis picture patient was treated accordingly. His lab work in the MICU showed Hgb A1c of 10.3 consistent with diabetes. Endocrine consult was called and he was being followed. He was started on lipitor 40mg qHs, fenofibrate 145 qHs, and omega 3 2g BID. His last triglycerides before transfer back was ____.     Of note patient has also been complaining of constant 7/10 - 10/10 suprapubic pain radiating to the lower back. Worse when bladder is full, better after urinating. Patient reports having only one sexual partner, his girlfriend, and denies history of STDs. UA was negative for pyuria. Physical exam showed a soft, suprapubic tenderness to deep palpation, nondistended obese abdomen. CT scan shows "RETROPERITONEUM: No lymphadenopathy. Mild amount of fluid tracking along   the right retroperitoneal space." Patient has been afebrile during his stay.     TO DO:   [ ] Please work-up the suprapubic pain   [ ] Please follow up with endocrine reccs for diabetes   [ ] Patient has been hypertensive during his stay (systolics 140s-150s), please begin antihypertensives        MICU PLAN     38M PMH gout presenting with abdominal pain, high triglycerides with CT concerning for acute pancreatitis.  MICU consulted for possible need for insulin gtt and admission to MICU.    Neuro:   - alert, oriented, protecting airway  - hydromorphone pushes for pain, 1 mg Q2H PRN    CV:   - hemodynamically stable, no current issues.      Pulm:   - No active issues at this time.  - Pt does have intermittent desats while sleeping, should have sleep study outpatient.    GI:   - c/w insulin gtt at 11U  - start D5 NS at 150cc/hr  - q12h triglycerides. Last   - continue to monitor FSG q1hr, goal > 70  - can stop insulin gtt when triglycerides are <500  - c/w carb consistent diet with low fat      Renal/:  - c/w strict i/o  - patient with suprapubic pain radiating to the back and worse with urination. CT scan shows no evidence of stones and UA negative for blood. Possibility for small renal stone.   - possible urethritis vs prostatitis vs interstitial cystitis. UA negative for pyuria       Endo:  - patient has no history of diabetes but presents with serum glucose about 300 and >1000 glucose in urine.   - Hgb A1c 10.3, consistent with new onset diabetes  - Endocrine following   - started on lipitor 40mg, fenofibrate 145mg, and omega3 2g BID    ID:   - no issues    Heme:  No acute issues, H/H stable.    VTE PPX:  - Lovenox 40mg QD           ASSESSMENT & PLAN:         For Follow-Up: MICU Transfer Note    Transfer from: MICU  Transfer to:  (X) Medicine    (  ) Telemetry    (  ) RCU    (  ) Palliative    (  ) Stroke Unit    (  ) _______________  Accepting physician:     HPI:  38M PMH gout (intermittently takes advil and Cocrys) presenting with 5 days of abdominal pain.  Per pt, he was [place] until 8[place], when he started having pain in lower abdomen.  Per pt, he was in [place] for 1 week, returned on 8/23.  Pt stated he removed bags from overhead bin in airplane at which point he felt some pain in lower abdomen.  Pt states he thought it was a muscle strain, thought nothing of it.  The next day, pain continued in lower abdomen, pt states he started taking gas-x thinking it was gas pain, which helped a little.  Pain continued over the weekend.  Pt states he took Advil which helped a little with the pain, however the pain did not go away.  Over the weekend, pain started also having midline lower back.  This morning, at 5 am, pt started having sharp pain again in lower abdomen and took Advil  Pain did not resolve so patient came to ED for further evaluation. Patient never had a similar pain before.     In the ED, vital signs T: 98.8, , /97, RR 16, 100% RA.  Pt had CT scan showing mild peripancreatic stranding suggestive of acute pancreatitis, with triglycerides to 1800s.  MICU was consulted for high triglycerides causing pancreatitis.    Of note, patient states he had been drinking heavily in the last week in [place], up to 1/2 pint of Gabriel Walker daily with [employer]  Pt states his mother has high triglycerides as well, never had pancreatitis.    MICU COURSE: Patient was bought into the MICU for suspected pancreatitis. Patient had triglycerides of 1870 with CT abdomen showing mild peripancreatic stranding consistent with acute pancreatitis. However patient had lipase of 56.5 and never had epigastric pain. Despite not presenting with the typical pancreatitis picture patient was treated accordingly. His lab work in the MICU showed Hgb A1c of 10.3 consistent with diabetes. Endocrine consult was called and he was being followed. He was started on lipitor 40mg qHs, fenofibrate 145 qHs, and omega 3 2g BID. His last triglycerides before transfer back was ____.     Of note patient has also been complaining of constant 7/10 - 10/10 suprapubic pain radiating to the lower back. Worse when bladder is full, better after urinating. Patient reports having only one sexual partner, his girlfriend, and denies history of STDs. UA was negative for pyuria. Physical exam showed a soft, suprapubic tenderness to deep palpation, nondistended obese abdomen. CT scan shows "RETROPERITONEUM: No lymphadenopathy. Mild amount of fluid tracking along   the right retroperitoneal space." Patient has been afebrile during his stay.     TO DO:   [ ] Please work-up the suprapubic pain   [ ] Please follow up with endocrine reccs for diabetes   [ ] Patient has been hypertensive during his stay (systolics 140s-150s), please begin antihypertensive therapy. We started the patient on lisinopril 2.5 PO QD while here  [ ] I ordered the BMP and triglycerides for tomorrow. No need to trend CBC.         MICU PLAN     38M PMH gout presenting with abdominal pain, high triglycerides with CT concerning for acute pancreatitis.  MICU consulted for possible need for insulin gtt and admission to MICU.    Neuro:   - alert, oriented, protecting airway  - hydromorphone pushes for pain, 1 mg Q2H PRN    CV:   - hemodynamically stable, no current issues.      Pulm:   - No active issues at this time.  - Pt does have intermittent desats while sleeping, should have sleep study outpatient.    GI:   - c/w insulin gtt at 11U  - start D5 NS at 150cc/hr  - q12h triglycerides. Last   - continue to monitor FSG q1hr, goal > 70  - can stop insulin gtt when triglycerides are <500  - c/w carb consistent diet with low fat      Renal/:  - c/w strict i/o  - patient with suprapubic pain radiating to the back and worse with urination. CT scan shows no evidence of stones and UA negative for blood. Possibility for small renal stone.   - possible urethritis vs prostatitis vs interstitial cystitis. UA negative for pyuria       Endo:  - patient has no history of diabetes but presents with serum glucose about 300 and >1000 glucose in urine.   - Hgb A1c 10.3, consistent with new onset diabetes  - Endocrine following   - started on lipitor 40mg, fenofibrate 145mg, and omega3 2g BID    ID:   - no issues    Heme:  No acute issues, H/H stable.    VTE PPX:  - Lovenox 40mg QD           ASSESSMENT & PLAN:         For Follow-Up:

## 2018-08-29 NOTE — PROGRESS NOTE ADULT - SUBJECTIVE AND OBJECTIVE BOX
INTERVAL HPI/OVERNIGHT EVENTS: no acute events.     SUBJECTIVE: Patient seen and examined at bedside. Patient denies chest pain/shortness of breath/nausea. Patient continues to report suprapubic pain that worsens with urination.     CONSTITUTIONAL: No weakness, fevers or chills  EYES/ENT: No visual changes;  No vertigo or throat pain   NECK: No pain or stiffness  RESPIRATORY: No cough, wheezing, hemoptysis; No shortness of breath  CARDIOVASCULAR: No chest pain or palpitations  GASTROINTESTINAL: No abdominal or epigastric pain. No nausea, vomiting, or hematemesis; No diarrhea or constipation. No melena or hematochezia.  GENITOURINARY: No dysuria, frequency or hematuria  NEUROLOGICAL: No numbness or weakness  SKIN: No itching, rashes    OBJECTIVE:    VITAL SIGNS:  ICU Vital Signs Last 24 Hrs  T(C): 36.2 (29 Aug 2018 12:00), Max: 37.3 (28 Aug 2018 20:00)  T(F): 97.1 (29 Aug 2018 12:00), Max: 99.2 (28 Aug 2018 20:00)  HR: 113 (29 Aug 2018 12:00) (71 - 113)  BP: 153/102 (29 Aug 2018 12:00) (130/89 - 164/116)  BP(mean): 114 (29 Aug 2018 12:00) (93 - 125)  ABP: --  ABP(mean): --  RR: 23 (29 Aug 2018 12:00) (13 - 23)  SpO2: 97% (29 Aug 2018 08:00) (95% - 100%)        08-28 @ 07:01 - 08-29 @ 07:00  --------------------------------------------------------  IN: 3332 mL / OUT: 1750 mL / NET: 1582 mL    08-29 @ 07:01 - 08-29 @ 13:10  --------------------------------------------------------  IN: 500 mL / OUT: 1250 mL / NET: -750 mL      CAPILLARY BLOOD GLUCOSE      POCT Blood Glucose.: 189 mg/dL (29 Aug 2018 11:36)      Physical Exam: Appearance: Sitting in bed, NAD  HEENT:   Normal oral mucosa, PERRL, EOMI, anicteric sclera  Lymphatic: No lymphadenopathy in cervical nodes or inguinal nodes  Cardiovascular: tachycardic, regular rhythm, No murmurs, gallops or rubs appreciated  Respiratory: Lungs clear to auscultation bilaterally, no wheezes, crackles appreciated  Gastrointestinal:  Soft, obese, nondistended. Suprapubic tenderness to deep palpation. No inguinal or umbilical hernia noted.   Skin: No rashes, ecchymosis or cyanosis noted	  Neurologic: AOx3, CNII-XII grossly intact, motor and sensory function grossly intact  Extremities: Moving all extremities equally, no edema noted  Psych:  Normal mood and affect, responds to questions appropriately      MEDICATIONS:  MEDICATIONS  (STANDING):  atorvastatin 40 milliGRAM(s) Oral at bedtime  chlorhexidine 4% Liquid 1 Application(s) Topical <User Schedule>  enoxaparin Injectable 40 milliGRAM(s) SubCutaneous daily  fenofibrate Tablet 145 milliGRAM(s) Oral at bedtime  insulin glargine Injectable (LANTUS) 20 Unit(s) SubCutaneous every morning  insulin lispro (HumaLOG) corrective regimen sliding scale   SubCutaneous three times a day before meals  insulin lispro (HumaLOG) corrective regimen sliding scale   SubCutaneous at bedtime  omega-3-Acid Ethyl Esters 2 Gram(s) Oral two times a day    MEDICATIONS  (PRN):  benzocaine 15 mG/menthol 3.6 mG Lozenge 1 Lozenge Oral four times a day PRN Sore Throat  HYDROmorphone  Injectable 1 milliGRAM(s) IV Push every 2 hours PRN Severe Pain (7 - 10)      ALLERGIES:  Allergies    No Known Allergies    Intolerances        LABS:                        12.2   6.77  )-----------( 248      ( 29 Aug 2018 05:10 )             37.4     08-29    139  |  103  |  5<L>  ----------------------------<  111<H>  3.8   |  25  |  0.87    Ca    8.9      29 Aug 2018 05:10  Phos  4.0     08-29  Mg     1.8     08-29    TPro  6.4  /  Alb  3.3  /  TBili  0.5  /  DBili  x   /  AST  23  /  ALT  38  /  AlkPhos  52  08-28          RADIOLOGY & ADDITIONAL TESTS: Reviewed.

## 2018-08-30 ENCOUNTER — TRANSCRIPTION ENCOUNTER (OUTPATIENT)
Age: 38
End: 2018-08-30

## 2018-08-30 VITALS
TEMPERATURE: 99 F | DIASTOLIC BLOOD PRESSURE: 95 MMHG | HEART RATE: 85 BPM | OXYGEN SATURATION: 100 % | RESPIRATION RATE: 18 BRPM | SYSTOLIC BLOOD PRESSURE: 141 MMHG

## 2018-08-30 DIAGNOSIS — R10.2 PELVIC AND PERINEAL PAIN: ICD-10-CM

## 2018-08-30 DIAGNOSIS — I10 ESSENTIAL (PRIMARY) HYPERTENSION: ICD-10-CM

## 2018-08-30 DIAGNOSIS — Z29.9 ENCOUNTER FOR PROPHYLACTIC MEASURES, UNSPECIFIED: ICD-10-CM

## 2018-08-30 PROBLEM — M10.9 GOUT, UNSPECIFIED: Chronic | Status: ACTIVE | Noted: 2018-08-27

## 2018-08-30 PROBLEM — Z00.00 ENCOUNTER FOR PREVENTIVE HEALTH EXAMINATION: Status: ACTIVE | Noted: 2018-08-30

## 2018-08-30 LAB
BUN SERPL-MCNC: 7 MG/DL — SIGNIFICANT CHANGE UP (ref 7–23)
CALCIUM SERPL-MCNC: 9.5 MG/DL — SIGNIFICANT CHANGE UP (ref 8.4–10.5)
CHLORIDE SERPL-SCNC: 95 MMOL/L — LOW (ref 98–107)
CO2 SERPL-SCNC: 26 MMOL/L — SIGNIFICANT CHANGE UP (ref 22–31)
CREAT SERPL-MCNC: 0.77 MG/DL — SIGNIFICANT CHANGE UP (ref 0.5–1.3)
GLUCOSE BLDC GLUCOMTR-MCNC: 158 MG/DL — HIGH (ref 70–99)
GLUCOSE BLDC GLUCOMTR-MCNC: 177 MG/DL — HIGH (ref 70–99)
GLUCOSE BLDC GLUCOMTR-MCNC: 201 MG/DL — HIGH (ref 70–99)
GLUCOSE SERPL-MCNC: 200 MG/DL — HIGH (ref 70–99)
HIV 1+2 AB+HIV1 P24 AG SERPL QL IA: SIGNIFICANT CHANGE UP
MAGNESIUM SERPL-MCNC: 1.7 MG/DL — SIGNIFICANT CHANGE UP (ref 1.6–2.6)
PHOSPHATE SERPL-MCNC: 3.5 MG/DL — SIGNIFICANT CHANGE UP (ref 2.5–4.5)
POTASSIUM SERPL-MCNC: 4.4 MMOL/L — SIGNIFICANT CHANGE UP (ref 3.5–5.3)
POTASSIUM SERPL-SCNC: 4.4 MMOL/L — SIGNIFICANT CHANGE UP (ref 3.5–5.3)
SODIUM SERPL-SCNC: 134 MMOL/L — LOW (ref 135–145)
T4 FREE SERPL-MCNC: 1.42 NG/DL — SIGNIFICANT CHANGE UP (ref 0.9–1.8)
TRIGL SERPL-MCNC: 424 MG/DL — HIGH (ref 10–149)
TSH SERPL-MCNC: 2.58 UIU/ML — SIGNIFICANT CHANGE UP (ref 0.27–4.2)

## 2018-08-30 PROCEDURE — 99239 HOSP IP/OBS DSCHRG MGMT >30: CPT

## 2018-08-30 PROCEDURE — 12345: CPT | Mod: NC

## 2018-08-30 PROCEDURE — 99233 SBSQ HOSP IP/OBS HIGH 50: CPT | Mod: GC

## 2018-08-30 RX ORDER — OXYCODONE HYDROCHLORIDE 5 MG/1
5 TABLET ORAL EVERY 6 HOURS
Qty: 0 | Refills: 0 | Status: DISCONTINUED | OUTPATIENT
Start: 2018-08-30 | End: 2018-08-30

## 2018-08-30 RX ORDER — OMEGA-3 ACID ETHYL ESTERS 1 G
2 CAPSULE ORAL
Qty: 120 | Refills: 0 | OUTPATIENT
Start: 2018-08-30 | End: 2018-09-28

## 2018-08-30 RX ORDER — LISINOPRIL 2.5 MG/1
1 TABLET ORAL
Qty: 30 | Refills: 0 | OUTPATIENT
Start: 2018-08-30 | End: 2018-09-28

## 2018-08-30 RX ORDER — ENOXAPARIN SODIUM 100 MG/ML
22 INJECTION SUBCUTANEOUS
Qty: 1 | Refills: 0 | OUTPATIENT
Start: 2018-08-30

## 2018-08-30 RX ORDER — OXYCODONE HYDROCHLORIDE 5 MG/1
1 TABLET ORAL
Qty: 12 | Refills: 0 | OUTPATIENT
Start: 2018-08-30 | End: 2018-09-01

## 2018-08-30 RX ORDER — ATORVASTATIN CALCIUM 80 MG/1
1 TABLET, FILM COATED ORAL
Qty: 30 | Refills: 0 | OUTPATIENT
Start: 2018-08-30 | End: 2018-09-28

## 2018-08-30 RX ORDER — ISOPROPYL ALCOHOL, BENZOCAINE .7; .06 ML/ML; ML/ML
0 SWAB TOPICAL
Qty: 2 | Refills: 0 | OUTPATIENT
Start: 2018-08-30

## 2018-08-30 RX ORDER — GLUCAGON INJECTION, SOLUTION 0.5 MG/.1ML
1 INJECTION, SOLUTION SUBCUTANEOUS
Qty: 2 | Refills: 0 | OUTPATIENT
Start: 2018-08-30

## 2018-08-30 RX ORDER — FENOFIBRATE,MICRONIZED 130 MG
1 CAPSULE ORAL
Qty: 30 | Refills: 0 | OUTPATIENT
Start: 2018-08-30 | End: 2018-09-28

## 2018-08-30 RX ORDER — INSULIN GLARGINE 100 [IU]/ML
20 INJECTION, SOLUTION SUBCUTANEOUS
Qty: 6 | Refills: 0 | OUTPATIENT
Start: 2018-08-30

## 2018-08-30 RX ORDER — ACETAMINOPHEN 500 MG
2 TABLET ORAL
Qty: 24 | Refills: 0 | OUTPATIENT
Start: 2018-08-30 | End: 2018-09-01

## 2018-08-30 RX ORDER — LISINOPRIL 2.5 MG/1
5 TABLET ORAL DAILY
Qty: 0 | Refills: 0 | Status: DISCONTINUED | OUTPATIENT
Start: 2018-08-30 | End: 2018-08-30

## 2018-08-30 RX ORDER — INSULIN ASPART 100 [IU]/ML
7 INJECTION, SOLUTION SUBCUTANEOUS
Qty: 6.3 | Refills: 0 | OUTPATIENT
Start: 2018-08-30 | End: 2018-09-28

## 2018-08-30 RX ORDER — ENOXAPARIN SODIUM 100 MG/ML
22 INJECTION SUBCUTANEOUS
Qty: 6.3 | Refills: 0 | OUTPATIENT
Start: 2018-08-30

## 2018-08-30 RX ORDER — ACETAMINOPHEN 500 MG
650 TABLET ORAL EVERY 6 HOURS
Qty: 0 | Refills: 0 | Status: DISCONTINUED | OUTPATIENT
Start: 2018-08-30 | End: 2018-08-30

## 2018-08-30 RX ORDER — INSULIN LISPRO 100/ML
6 VIAL (ML) SUBCUTANEOUS
Qty: 5.4 | Refills: 0 | OUTPATIENT
Start: 2018-08-30 | End: 2018-09-28

## 2018-08-30 RX ADMIN — Medication 6 UNIT(S): at 13:12

## 2018-08-30 RX ADMIN — HYDROMORPHONE HYDROCHLORIDE 1 MILLIGRAM(S): 2 INJECTION INTRAMUSCULAR; INTRAVENOUS; SUBCUTANEOUS at 03:39

## 2018-08-30 RX ADMIN — Medication 6 UNIT(S): at 09:05

## 2018-08-30 RX ADMIN — OXYCODONE HYDROCHLORIDE 5 MILLIGRAM(S): 5 TABLET ORAL at 15:16

## 2018-08-30 RX ADMIN — ENOXAPARIN SODIUM 40 MILLIGRAM(S): 100 INJECTION SUBCUTANEOUS at 11:44

## 2018-08-30 RX ADMIN — HYDROMORPHONE HYDROCHLORIDE 1 MILLIGRAM(S): 2 INJECTION INTRAMUSCULAR; INTRAVENOUS; SUBCUTANEOUS at 00:34

## 2018-08-30 RX ADMIN — LISINOPRIL 2.5 MILLIGRAM(S): 2.5 TABLET ORAL at 07:07

## 2018-08-30 RX ADMIN — Medication 650 MILLIGRAM(S): at 10:47

## 2018-08-30 RX ADMIN — Medication 2: at 18:16

## 2018-08-30 RX ADMIN — Medication 2 GRAM(S): at 10:04

## 2018-08-30 RX ADMIN — Medication 650 MILLIGRAM(S): at 10:04

## 2018-08-30 RX ADMIN — INSULIN GLARGINE 20 UNIT(S): 100 INJECTION, SOLUTION SUBCUTANEOUS at 09:05

## 2018-08-30 RX ADMIN — Medication 6 UNIT(S): at 18:17

## 2018-08-30 RX ADMIN — Medication 4: at 09:05

## 2018-08-30 RX ADMIN — HYDROMORPHONE HYDROCHLORIDE 1 MILLIGRAM(S): 2 INJECTION INTRAMUSCULAR; INTRAVENOUS; SUBCUTANEOUS at 03:23

## 2018-08-30 RX ADMIN — OXYCODONE HYDROCHLORIDE 5 MILLIGRAM(S): 5 TABLET ORAL at 16:16

## 2018-08-30 RX ADMIN — HYDROMORPHONE HYDROCHLORIDE 1 MILLIGRAM(S): 2 INJECTION INTRAMUSCULAR; INTRAVENOUS; SUBCUTANEOUS at 00:48

## 2018-08-30 RX ADMIN — HYDROMORPHONE HYDROCHLORIDE 1 MILLIGRAM(S): 2 INJECTION INTRAMUSCULAR; INTRAVENOUS; SUBCUTANEOUS at 07:08

## 2018-08-30 RX ADMIN — HYDROMORPHONE HYDROCHLORIDE 1 MILLIGRAM(S): 2 INJECTION INTRAMUSCULAR; INTRAVENOUS; SUBCUTANEOUS at 07:20

## 2018-08-30 RX ADMIN — Medication 2: at 13:12

## 2018-08-30 NOTE — DISCHARGE NOTE ADULT - NS AS DC FOLLOWUP STROKE INST
Diabetic Education , counting carbohydrates, monitoring glucose, diet exercise ;HgA1C and follow up care yearly.

## 2018-08-30 NOTE — PROGRESS NOTE ADULT - ATTENDING COMMENTS
Patient examined and case reviewed in detail on bedside rounds
Patient examined and case reviewed in detail on bedside rounds
Patient seen and examined at bedside with house staff team. Patient reports RLQ pain, which is the original symptom that brought him to the hospital, has resolved. Discussed new diagnoses of diabetes and likely hypertension. Discussed also elevated triglycerides. Patient is amenable to PCP f/u. Will also need endocrine f/u. Patient denies other complaints. Tolerating oral diet.    Will need final endocrine recs regarding treatment of newly diagnosed diabetes. If patient's pain doesn't recur or can be managed with oral pain medications, will be stable for d/c home today.    40 min discharge time.

## 2018-08-30 NOTE — DISCHARGE NOTE ADULT - HOME CARE AGENCY
[facility] [place] [phone]. Nurse to visit on 08/31/18. Nurse to call prior to visit to arrange.

## 2018-08-30 NOTE — PROGRESS NOTE ADULT - ASSESSMENT
38M PMH gout (intermittently takes advil and Cocrys) presenting with 5 days of abdominal pain in RLQ admitted to MICU for acute pancreatitis 2/2 hypertriglyceridemia.  HbA1c - 10.3; new Dx of diabetes T1DM vs T2DM
38M PMH gout presenting with abdominal pain, high triglycerides with CT concerning for acute pancreatitis.  MICU consulted for possible need for insulin gtt and admission to MICU.    Neuro:   - alert, oriented, protecting airway  - hydromorphone pushes for pain, 1 mg Q2H PRN    CV:   - hemodynamically stable, no current issues.      Pulm:   - No active issues at this time.  - Pt does have intermittent desats while sleeping, should have sleep study outpatient.    GI:   - c/w insulin gtt at 11U  - start D5 NS at 150cc/hr  - q12h triglycerides. Last   - continue to monitor FSG q1hr, goal > 70  - can stop insulin gtt when triglycerides are <500  - c/w carb consistent diet with low fat      Renal/:  - c/w strict i/o  - patient with suprapubic pain radiating to the back and worse with urination. CT scan shows no evidence of stones and UA negative for blood. Possibility for small renal stone.   - possible urethritis vs prostatitis vs interstitial cystitis. UA negative for pyuria       Endo:  - patient has no history of diabetes but presents with serum glucose about 300 and >1000 glucose in urine.   - Hgb A1c 10.3, consistent with new onset diabetes  - Endocrine following   - started on lipitor 40mg, fenofibrate 145mg, and omega3 2g BID    ID:   - no issues    Heme:  No acute issues, H/H stable.    VTE PPX:  - Lovenox 40mg QD
38M PMH gout presenting with abdominal pain, high triglycerides with CT concerning for acute pancreatitis.  MICU consulted for possible need for insulin gtt and admission to MICU.    Neuro:   - alert, oriented, protecting airway  - hydromorphone pushes for pain, 1 mg Q2H PRN  - received ketorolac 15mg IV x 1. Will reassess pain.         CV:   - hemodynamically stable, no current issues.      Pulm:   - No active issues at this time.  - Pt does have intermittent desats while sleeping, should have sleep study outpatient.    GI:   - c/w insulin gtt at 11U  - start D5 NS at 150cc/hr  - q6h triglycerides  - continue to monitor FSG q1hr, goal > 70  - can stop insulin gtt when triglycerides are <500  - c/w carb consistent diet with low fat      Renal/:  - c/w strict i/o  - patient with suprapubic pain radiating to the back and worse with urination. CT scan shows no evidence of stones and UA negative for blood. Possibility for small renal stone.     Endo:  - patient has no history of diabetes but presents with serum glucose about 300 and >1000 glucose in urine.   - Hgb A1c 10.3, consistent with new onset diabetes  - Endocrine following     ID:   - no issues    Heme:  No acute issues, H/H stable.    VTE PPX:  - Lovenox 40mg QD
39yo man with hx gout presents with suprapubic pain of unknown etiology.  Also with newly diagnosed hypertriglyceridemia and diabetes.  Pain improved today.
38M PMH gout (intermittently takes advil and Cocrys) presenting with 5 days of abdominal pain in RLQ admitted to MICU for acute pancreatitis 2/2 hypertriglyceridemia. s/p MICU stay, s/p insulin drip; HbA1c - 10.3; new Dx of diabetes T1DM vs T2DM

## 2018-08-30 NOTE — PROGRESS NOTE ADULT - PROBLEM SELECTOR PLAN 5
Improve score 0. No DVT ppx indicated  Consistent Carb diet  Chad Russell [facility] [id number]

## 2018-08-30 NOTE — DISCHARGE NOTE ADULT - NSFTFSERV2RD_GEN_ALL_CORE
Pt. States she wants to try a muscle relaxer. Dr. Isabella Toro notified. Pt. Informed bp running on the lower side and fluids ordered. Once bp is within a good range muscle relaxer will be ordered. Pt. Agreeable to poc. Pt. Repositioned in bed for comfort and warm blanket applied under back.      Genoveva Ball RN  09/30/17 2295 Nursing

## 2018-08-30 NOTE — PROGRESS NOTE ADULT - SUBJECTIVE AND OBJECTIVE BOX
CHIEF COMPLAINT: abdominal pain    Interval Events:    No abdominal pain this morning but taking Dilaudid every 3-4 hours.  Also has a slight headache    REVIEW OF SYSTEMS:    CONSTITUTIONAL: No weakness, fevers or chills  EYES/ENT: No visual changes;  No vertigo or throat pain   NECK: No pain or stiffness  RESPIRATORY: No cough, wheezing, hemoptysis; No shortness of breath  CARDIOVASCULAR: No chest pain or palpitations  GASTROINTESTINAL: No abdominal or epigastric pain. No nausea, vomiting, or hematemesis; No diarrhea or constipation. No melena or hematochezia.  GENITOURINARY: No dysuria, frequency or hematuria  NEUROLOGICAL: No numbness or weakness  SKIN: No itching, rashes      OBJECTIVE:  ICU Vital Signs Last 24 Hrs  T(C): 36.3 (30 Aug 2018 07:00), Max: 36.8 (29 Aug 2018 16:35)  T(F): 97.4 (30 Aug 2018 07:00), Max: 98.3 (29 Aug 2018 16:35)  HR: 93 (30 Aug 2018 07:00) (79 - 113)  BP: 151/97 (30 Aug 2018 07:00) (131/84 - 157/113)  BP(mean): 112 (29 Aug 2018 15:30) (93 - 117)  ABP: --  ABP(mean): --  RR: 17 (30 Aug 2018 07:00) (13 - 23)  SpO2: 98% (30 Aug 2018 07:00) (96% - 100%)        08-29 @ 07:01  -  08-30 @ 07:00  --------------------------------------------------------  IN: 500 mL / OUT: 1750 mL / NET: -1250 mL      CAPILLARY BLOOD GLUCOSE  203 (29 Aug 2018 18:06)      POCT Blood Glucose.: 204 mg/dL (29 Aug 2018 21:38)    PHYSICAL EXAM:  GENERAL: NAD, well-developed  HEAD:  Atraumatic, Normocephalic  EYES: EOMI, PERRLA, conjunctiva and sclera clear  NECK: Supple, No JVD  CHEST/LUNG: Clear to auscultation bilaterally; No wheeze  HEART: Regular rate and rhythm; No murmurs, rubs, or gallops  ABDOMEN: Soft, Nontender, Nondistended; Bowel sounds present  EXTREMITIES:  2+ Peripheral Pulses, No clubbing, cyanosis, or edema  PSYCH: AAOx3  NEUROLOGY: non-focal  SKIN: No rashes or lesions    LINES:    HOSPITAL MEDICATIONS:  enoxaparin Injectable 40 milliGRAM(s) SubCutaneous daily      lisinopril 2.5 milliGRAM(s) Oral daily    atorvastatin 40 milliGRAM(s) Oral at bedtime  fenofibrate Tablet 145 milliGRAM(s) Oral at bedtime  insulin glargine Injectable (LANTUS) 20 Unit(s) SubCutaneous every morning  insulin lispro (HumaLOG) corrective regimen sliding scale   SubCutaneous three times a day before meals  insulin lispro (HumaLOG) corrective regimen sliding scale   SubCutaneous at bedtime  insulin lispro Injectable (HumaLOG) 6 Unit(s) SubCutaneous three times a day before meals      HYDROmorphone  Injectable 1 milliGRAM(s) IV Push every 2 hours PRN              benzocaine 15 mG/menthol 3.6 mG Lozenge 1 Lozenge Oral four times a day PRN    omega-3-Acid Ethyl Esters 2 Gram(s) Oral two times a day      LABS:                        12.2   6.77  )-----------( 248      ( 29 Aug 2018 05:10 )             37.4     Hgb Trend: 12.2<--, 11.9<--, 14.3<--  08-29    136  |  100  |  4<L>  ----------------------------<  217<H>  4.2   |  22  |  0.73    Ca    9.3      29 Aug 2018 12:25  Phos  3.2     08-29  Mg     1.6     08-29      Creatinine Trend: 0.73<--, 0.87<--, 0.85<--, 0.78<--, 0.71<--, 0.72<--            MICROBIOLOGY:     RADIOLOGY:  [ ] Reviewed and interpreted by me    EKG:

## 2018-08-30 NOTE — PROGRESS NOTE ADULT - PROBLEM SELECTOR PLAN 3
On arrival 1800, downtrended to 400  -C/w lipitor, fenofibrate, omega 3
resolved , off insulin drip, tolerating diet, TG level improved
resolved , off insulin drip, tolerating diet, TG level improved

## 2018-08-30 NOTE — PROGRESS NOTE ADULT - PROBLEM SELECTOR PROBLEM 3
Hypertriglyceridemia
Other acute pancreatitis without infection or necrosis
Other acute pancreatitis without infection or necrosis

## 2018-08-30 NOTE — DISCHARGE NOTE ADULT - CARE PLAN
Principal Discharge DX:	Suprapubic pain  Goal:	Ongoing management  Assessment and plan of treatment:	You were in the hospital with pain in you lower abdomen and pelvis.  The pain might be related to inflammation in your pancreas.  You improved with supportive treatment.  You will need to follow up with a primary care doctor to continue to watch this condition.  Secondary Diagnosis:	Diabetes  Goal:	Ongoing management  Assessment and plan of treatment:	You were diagnosed with diabetes while here in the hospital.  You were able to control your sugars with the insulin that the endocrine doctors.  Please take the insulin as prescribed and follow up with Dr. Weinstein.  Secondary Diagnosis:	Hypertension  Goal:	Ongoing management  Assessment and plan of treatment:	You were diagnosed with hypertension in the hospital.  We started you on a blood pressure medication here that we encourage you to continue to take.  Please also follow-up with a primary doctor to manage this condition  Secondary Diagnosis:	Hypertriglyceridemia  Goal:	Ongoing management  Assessment and plan of treatment:	You were also found to have high fat levels in your blood.  Please continue the medications as prescribed for this condition.

## 2018-08-30 NOTE — DISCHARGE NOTE ADULT - PLAN OF CARE
Ongoing management You were in the hospital with pain in you lower abdomen and pelvis.  The pain might be related to inflammation in your pancreas.  You improved with supportive treatment.  You will need to follow up with a primary care doctor to continue to watch this condition. You were diagnosed with diabetes while here in the hospital.  You were able to control your sugars with the insulin that the endocrine doctors.  Please take the insulin as prescribed and follow up with Dr. Weinstein. You were diagnosed with hypertension in the hospital.  We started you on a blood pressure medication here that we encourage you to continue to take.  Please also follow-up with a primary doctor to manage this condition You were also found to have high fat levels in your blood.  Please continue the medications as prescribed for this condition.

## 2018-08-30 NOTE — DISCHARGE NOTE ADULT - CARE PROVIDER_API CALL
Clovis Weinstein (), Internal Medicine  [address] [address]  Phone: [phone]  Fax: [phone]    Rojelio Keller), Internal Medicine  [id number] [place] [id number]  Phone: [phone]  Fax: [phone]

## 2018-08-30 NOTE — DISCHARGE NOTE ADULT - PATIENT PORTAL LINK FT
You can access the [facility], offered by [facility], by registering with the following website: [email or web address]

## 2018-08-30 NOTE — PROGRESS NOTE ADULT - PROBLEM SELECTOR PLAN 1
T1DM vs undiagnosed T2DM   While inpatient - c/w basal + bolus regimen, 22 units lantus every morning and 7 units humalog before meals plus moderate scale  low carb diet as tolerates  Goal HbA1c- 6.5-7.0, recent HbA1c -10.3  Goal -180 while inpatient  new dx Diabetes education   Nutrition consult  pending levels for Anti LIANET Abs and Anti islet cell Abs  Upon dc - pt will continue with basal + bolus regimen vs oral + basal plan, Pt now agrees with basal + bolus plan, Pt will c/w 22 units Lantus every morning and 7 units Humalog before meals three times a day.   Pt will follow up with our o/p location [address]([phone]).  TENTATIVE PLAN FINAL PLAN TO BE DISCUSSED WITH ATTENDING T1DM vs undiagnosed T2DM   While inpatient - c/w basal + bolus regimen, 22 units lantus every morning and 7 units humalog before meals plus moderate scale  low carb diet as tolerates  Goal HbA1c- 6.5-7.0, recent HbA1c -10.3  Goal -180 while inpatient  new dx Diabetes education   Nutrition consult  pending levels for Anti LIANET Abs and Anti islet cell Abs  Upon dc - pt will continue with basal + bolus regimen vs oral + basal plan, Pt now agrees with basal + bolus plan, Pt will c/w 22 units Lantus (or basaglar whatever is covered by insurance) every morning and 7 units Humalog    (or novolog whatever is covered by insurance) before meals three times a day.   Pt will follow up with our o/p location [address]([phone]).  Pt will need new glucometer prescription, lancets, test strips, ETOH swabs, insulin pen needles upon discharge; TENTATIVE PLAN FINAL PLAN TO BE DISCUSSED WITH ATTENDING T1DM vs undiagnosed T2DM   While inpatient - c/w basal + bolus regimen, 22 units lantus every morning and 7 units humalog before meals plus moderate scale  low carb diet as tolerates  Goal HbA1c- 6.5-7.0, recent HbA1c -10.3  Goal -180 while inpatient  new dx Diabetes education   Nutrition consult  pending levels for Anti LIANET Abs and Anti islet cell Abs  Upon dc - pt will continue with basal + bolus regimen vs oral + basal plan, Pt now agrees with basal + bolus plan, Pt will c/w 22 units Lantus (or basaglar whatever is covered by insurance) every morning and 7 units Humalog    (or novolog whatever is covered by insurance) before meals three times a day.   Pt will follow up with our o/p location [address] [facility] clinic([phone]).  Pt will need new glucometer prescription, lancets, test strips, ETOH swabs, insulin pen needles upon discharge; T1DM vs undiagnosed T2DM   While inpatient - c/w basal + bolus regimen, 22 units lantus every morning and 7 units humalog before meals plus moderate scale  low carb diet as tolerates  Goal HbA1c- 6.5-7.0, recent HbA1c -10.3  Goal -180 while inpatient  new dx Diabetes education   Nutrition consult  pending levels for Anti LIANET Abs and Anti islet cell Abs  Upon dc - pt will continue with basal + bolus regimen vs oral + basal plan, Pt now agrees with basal + bolus plan, Pt will c/w 22 units Lantus (or basaglar whatever is covered by insurance) every morning and 7 units Humalog    (or novolog whatever is covered by insurance) before meals three times a day.   Pt will follow up with our o/p location [address] [facility] clinic([phone]) with Dr. Weinstein on 09/15/2018  Pt will need new glucometer prescription, lancets, test strips, ETOH swabs, insulin pen needles upon discharge; T1DM vs undiagnosed T2DM   While inpatient - c/w basal + bolus regimen, 22 units lantus every morning and 7 units humalog before meals plus moderate scale  low carb diet as tolerates  Goal HbA1c- 6.5-7.0, recent HbA1c -10.3  Goal -180 while inpatient  new dx Diabetes education   Nutrition consult  pending levels for Anti LIANET Abs and Anti islet cell Abs  Upon dc - pt will continue with basal + bolus regimen vs oral + basal plan, Pt now agrees with basal + bolus plan, Pt will c/w 22 units Lantus (or basaglar whatever is covered by insurance) every morning and 7 units Humalog    (or novolog whatever is covered by insurance) before meals three times a day.   Pt will follow up with our o/p location [address] clinic([phone]) with Dr. Weinstein on 09/15/2018  Pt will need new glucometer prescription, lancets, test strips, ETOH swabs, insulin pen needles upon discharge

## 2018-08-30 NOTE — PROGRESS NOTE ADULT - PROBLEM SELECTOR PLAN 2
Newly diagnosed with A1c 10.8  -Lantus 20, Humalog 6  -Blood sugars in low 200s  -Will follow-up endocrine recs for discharge

## 2018-08-30 NOTE — DISCHARGE NOTE ADULT - MEDICATION SUMMARY - MEDICATIONS TO TAKE
I will START or STAY ON the medications listed below when I get home from the hospital:    Alcohol swabs  -- Alcohol Swab Pads  Use as directed  Disp 2 boxes  -- Indication: For Diabetes    Glucometer  -- Test blood sugar 4 times daily  Disp 1 glucometer  -- Indication: For Diabetes    Insulin pen needles  -- BD Alison pen needles, 4mm  Use as directed 4  times daily  Disp 2 boxes    -- Indication: For Diabetes    Lancets  -- Lancets   Test blood sugar 4 times daily  Disp 2 boxes    -- Indication: For Diabetes    Test strips  -- Glucose test strips  Test blood sugar 4 times daily  Disp 120 strips  -- Indication: For Diabetes    oxyCODONE 5 mg oral tablet  -- 1 tab(s) by mouth every 6 hours, As needed, Severe Pain (7 - 10) MDD:4 tabs  -- Indication: For Abdominal pain    acetaminophen 325 mg oral tablet  -- 2 tab(s) by mouth every 6 hours, As needed, mild and moderate pain  -- Indication: For Abdominal pain    lisinopril 5 mg oral tablet  -- 1 tab(s) by mouth once a day  -- Indication: For Hypertension    Lantus Solostar Pen 100 units/mL subcutaneous solution  -- Lantus Solostar Pen  Inject 22 Units SQ qhs  Disp 3ml x 5 pens  -- Do not drink alcoholic beverages when taking this medication.  It is very important that you take or use this exactly as directed.  Do not skip doses or discontinue unless directed by your doctor.  Keep in refrigerator.  Do not freeze.    -- Indication: For Diabetes    NovoLOG 100 units/mL injectable solution  -- Novolog Flexpen  Inject 7 units SQ 10-15 units before meals  Disp 3ml x 5 pens  -- Check with your doctor before becoming pregnant.  Do not drink alcoholic beverages when taking this medication.  Keep in refrigerator.  Do not freeze.  Obtain medical advice before taking any non-prescription drugs as some may affect the action of this medication.    -- Indication: For Diabetes    atorvastatin 40 mg oral tablet  -- 1 tab(s) by mouth once a day (at bedtime)  -- Indication: For Hyperlipidemia    fenofibrate 145 mg oral tablet  -- 1 tab(s) by mouth once a day (at bedtime)  -- Indication: For Hyperlipidemia    Glucagon Emergency Kit for Low Blood Sugar 1 mg injection  -- Glucagon Emergency Kit   Use as directed   Disp # 2 (two)    -- Indication: For Diabetes    omega-3 polyunsaturated fatty acids ethyl esters 1000 mg oral capsule  -- 2 cap(s) by mouth 2 times a day  -- Indication: For Hyperlipidemia I will START or STAY ON the medications listed below when I get home from the hospital:    Alcohol swabs  -- Alcohol Swab Pads  Use as directed  Disp 2 boxes  -- Indication: For Diabetes    Glucometer  -- Test blood sugar 4 times daily  Disp 1 glucometer  -- Indication: For Diabetes    Insulin pen needles  -- BD Alison pen needles, 4mm  Use as directed 4  times daily  Disp 2 boxes    -- Indication: For Diabetes    Lancets  -- Lancets   Test blood sugar 4 times daily  Disp 2 boxes    -- Indication: For Diabetes    Test strips  -- Glucose test strips  Test blood sugar 4 times daily  Disp 120 strips  -- Indication: For Diabetes    oxyCODONE 5 mg oral tablet  -- 1 tab(s) by mouth every 6 hours, As needed, Severe Pain (7 - 10) MDD:4 tabs  -- Indication: For Abdominal pain    acetaminophen 325 mg oral tablet  -- 2 tab(s) by mouth every 6 hours, As needed, mild and moderate pain  -- Indication: For Abdominal pain    lisinopril 5 mg oral tablet  -- 1 tab(s) by mouth once a day  -- Indication: For Hypertension    Lantus Solostar Pen 100 units/mL subcutaneous solution  -- Lantus Solostar Pen  Inject 22 Units SQ qhs  Disp 3ml x 5 pens  -- Do not drink alcoholic beverages when taking this medication.  It is very important that you take or use this exactly as directed.  Do not skip doses or discontinue unless directed by your doctor.  Keep in refrigerator.  Do not freeze.    -- Indication: For Diabetes    NovoLOG 100 units/mL injectable solution  -- Novolog Flexpen  Inject 7 units SQ 10-15 units before meals  Disp 3ml x 5 pens  -- Check with your doctor before becoming pregnant.  Do not drink alcoholic beverages when taking this medication.  Keep in refrigerator.  Do not freeze.  Obtain medical advice before taking any non-prescription drugs as some may affect the action of this medication.    -- Indication: For Diabetes    atorvastatin 40 mg oral tablet  -- 1 tab(s) by mouth once a day (at bedtime)  -- Indication: For Hyperlipidemia    fenofibrate 145 mg oral tablet  -- 1 tab(s) by mouth once a day (at bedtime)  -- Indication: For Hyperlipidemia    omega-3 polyunsaturated fatty acids ethyl esters 1000 mg oral capsule  -- 2 cap(s) by mouth 2 times a day  -- Indication: For Hyperlipidemia I will START or STAY ON the medications listed below when I get home from the hospital:    Alcohol swabs  -- Alcohol Swab Pads  Use as directed  Disp 2 boxes  -- Indication: For Diabetes    Glucometer  -- Test blood sugar 4 times daily  Disp 1 glucometer  -- Indication: For Diabetes    Insulin pen needles  -- BD Alison pen needles, 4mm  Use as directed 4  times daily  Disp 2 boxes    -- Indication: For Diabetes    Lancets  -- Lancets   Test blood sugar 4 times daily  Disp 2 boxes    -- Indication: For Diabetes    Test strips  -- Glucose test strips  Test blood sugar 4 times daily  Disp 120 strips  -- Indication: For Diabetes    oxyCODONE 5 mg oral tablet  -- 1 tab(s) by mouth every 6 hours, As needed, Severe Pain (7 - 10) MDD:4 tabs  -- Indication: For Abdominal pain    acetaminophen 325 mg oral tablet  -- 2 tab(s) by mouth every 6 hours, As needed, mild and moderate pain  -- Indication: For Abdominal pain    lisinopril 5 mg oral tablet  -- 1 tab(s) by mouth once a day  -- Indication: For Hypertension    NovoLOG 100 units/mL injectable solution  -- Novolog Flexpen  Inject 7 units SQ 10-15 units before meals  Disp 3ml x 5 pens  -- Check with your doctor before becoming pregnant.  Do not drink alcoholic beverages when taking this medication.  Keep in refrigerator.  Do not freeze.  Obtain medical advice before taking any non-prescription drugs as some may affect the action of this medication.    -- Indication: For Diabetes    Lantus Solostar Pen 100 units/mL subcutaneous solution  -- Lantus Solostar Pen  Inject 22 units SQ in the morning  Disp 3ml x 5 pens  -- Do not drink alcoholic beverages when taking this medication.  It is very important that you take or use this exactly as directed.  Do not skip doses or discontinue unless directed by your doctor.  Keep in refrigerator.  Do not freeze.    -- Indication: For Diabetes    atorvastatin 40 mg oral tablet  -- 1 tab(s) by mouth once a day (at bedtime)  -- Indication: For Hyperlipidemia    fenofibrate 145 mg oral tablet  -- 1 tab(s) by mouth once a day (at bedtime)  -- Indication: For Hyperlipidemia    omega-3 polyunsaturated fatty acids ethyl esters 1000 mg oral capsule  -- 2 cap(s) by mouth 2 times a day  -- Indication: For Hyperlipidemia

## 2018-08-30 NOTE — PROGRESS NOTE ADULT - SUBJECTIVE AND OBJECTIVE BOX
Chief Complaint/Follow-up on:     Subjective:    MEDICATIONS  (STANDING):  atorvastatin 40 milliGRAM(s) Oral at bedtime  fenofibrate Tablet 145 milliGRAM(s) Oral at bedtime  insulin glargine Injectable (LANTUS) 20 Unit(s) SubCutaneous every morning  insulin lispro (HumaLOG) corrective regimen sliding scale   SubCutaneous three times a day before meals  insulin lispro (HumaLOG) corrective regimen sliding scale   SubCutaneous at bedtime  insulin lispro Injectable (HumaLOG) 6 Unit(s) SubCutaneous three times a day before meals  lisinopril 5 milliGRAM(s) Oral daily  omega-3-Acid Ethyl Esters 2 Gram(s) Oral two times a day    MEDICATIONS  (PRN):  acetaminophen   Tablet. 650 milliGRAM(s) Oral every 6 hours PRN mild and moderate pain  benzocaine 15 mG/menthol 3.6 mG Lozenge 1 Lozenge Oral four times a day PRN Sore Throat  oxyCODONE    IR 5 milliGRAM(s) Oral every 6 hours PRN Severe Pain (7 - 10)      PHYSICAL EXAM:  VITALS: T(C): 37.4 (08-30-18 @ 14:14)  T(F): 99.3 (08-30-18 @ 14:14), Max: 99.3 (08-30-18 @ 14:14)  HR: 85 (08-30-18 @ 14:14) (85 - 93)  BP: 141/95 (08-30-18 @ 14:14) (141/95 - 157/113)  RR:  (14 - 18)  SpO2:  (96% - 100%)  Wt(kg): --  GENERAL: NAD, well-groomed, well-developed  EYES: No proptosis, no injection  HEENT:  Atraumatic, Normocephalic, moist mucous membranes  THYROID: Normal size, no palpable nodules  RESPIRATORY: Clear to auscultation bilaterally; No rales, rhonchi, wheezing, or rubs  CARDIOVASCULAR: Regular rate and rhythm; No murmurs; no peripheral edema  GI: Soft, nontender, non distended, normal bowel sounds  CUSHING'S SIGNS: no striae    POCT Blood Glucose.: 177 mg/dL (08-30-18 @ 13:10)  POCT Blood Glucose.: 201 mg/dL (08-30-18 @ 08:59)  POCT Blood Glucose.: 204 mg/dL (08-29-18 @ 21:38)  POCT Blood Glucose.: 189 mg/dL (08-29-18 @ 11:36)  POCT Blood Glucose.: 253 mg/dL (08-29-18 @ 08:29)  POCT Blood Glucose.: 96 mg/dL (08-29-18 @ 07:00)  POCT Blood Glucose.: 107 mg/dL (08-29-18 @ 06:15)  POCT Blood Glucose.: 105 mg/dL (08-29-18 @ 05:07)  POCT Blood Glucose.: 114 mg/dL (08-29-18 @ 04:15)  POCT Blood Glucose.: 116 mg/dL (08-29-18 @ 03:15)  POCT Blood Glucose.: 140 mg/dL (08-29-18 @ 02:08)  POCT Blood Glucose.: 141 mg/dL (08-29-18 @ 01:08)  POCT Blood Glucose.: 148 mg/dL (08-29-18 @ 00:13)  POCT Blood Glucose.: 169 mg/dL (08-28-18 @ 23:02)  POCT Blood Glucose.: 159 mg/dL (08-28-18 @ 22:14)  POCT Blood Glucose.: 144 mg/dL (08-28-18 @ 21:06)  POCT Blood Glucose.: 114 mg/dL (08-28-18 @ 20:06)  POCT Blood Glucose.: 113 mg/dL (08-28-18 @ 19:10)  POCT Blood Glucose.: 107 mg/dL (08-28-18 @ 18:09)  POCT Blood Glucose.: 118 mg/dL (08-28-18 @ 17:04)  POCT Blood Glucose.: 119 mg/dL (08-28-18 @ 17:01)  POCT Blood Glucose.: 151 mg/dL (08-28-18 @ 16:14)  POCT Blood Glucose.: 171 mg/dL (08-28-18 @ 15:06)  POCT Blood Glucose.: 205 mg/dL (08-28-18 @ 13:58)  POCT Blood Glucose.: 244 mg/dL (08-28-18 @ 13:09)  POCT Blood Glucose.: 170 mg/dL (08-28-18 @ 11:59)  POCT Blood Glucose.: 187 mg/dL (08-28-18 @ 11:11)  POCT Blood Glucose.: 217 mg/dL (08-28-18 @ 10:05)  POCT Blood Glucose.: 169 mg/dL (08-28-18 @ 09:06)  POCT Blood Glucose.: 120 mg/dL (08-28-18 @ 08:07)  POCT Blood Glucose.: 132 mg/dL (08-28-18 @ 06:58)  POCT Blood Glucose.: 135 mg/dL (08-28-18 @ 05:58)  POCT Blood Glucose.: 144 mg/dL (08-28-18 @ 04:58)  POCT Blood Glucose.: 157 mg/dL (08-28-18 @ 04:04)  POCT Blood Glucose.: 96 mg/dL (08-28-18 @ 03:00)  POCT Blood Glucose.: 115 mg/dL (08-28-18 @ 02:00)  POCT Blood Glucose.: 123 mg/dL (08-28-18 @ 01:05)  POCT Blood Glucose.: 139 mg/dL (08-28-18 @ 00:03)  POCT Blood Glucose.: 192 mg/dL (08-27-18 @ 23:04)  POCT Blood Glucose.: 122 mg/dL (08-27-18 @ 21:34)  POCT Blood Glucose.: 146 mg/dL (08-27-18 @ 20:35)  POCT Blood Glucose.: 180 mg/dL (08-27-18 @ 19:30)  POCT Blood Glucose.: 213 mg/dL (08-27-18 @ 18:32)  POCT Blood Glucose.: 253 mg/dL (08-27-18 @ 17:41)    08-30    134<L>  |  95<L>  |  7   ----------------------------<  200<H>  4.4   |  26  |  0.77    EGFR if : 134  EGFR if non : 116    Ca    9.5      08-30  Mg     1.7     08-30  Phos  3.5     08-30    TPro  6.4  /  Alb  3.3  /  TBili  0.5  /  DBili  x   /  AST  23  /  ALT  38  /  AlkPhos  52  08-28          Thyroid Function Tests:  08-30 @ 06:51 TSH 2.58 FreeT4 1.42 T3 -- Anti TPO -- Anti Thyroglobulin Ab -- TSI --      Hemoglobin A1C, Whole Blood: 10.3 % <H> [4.0 - 5.6] (08-28-18 @ 04:00) Chief Complaint/Follow-up on:   New diagnosis of DM, acute pancreatitis 2/2 Hypertriglyceridemia    Subjective:  Pt was seen and examined at bedside, His Fs are suboptimal, RLQ pain now better, on pain meds, Dc planning.     MEDICATIONS  (STANDING):  atorvastatin 40 milliGRAM(s) Oral at bedtime  fenofibrate Tablet 145 milliGRAM(s) Oral at bedtime  insulin glargine Injectable (LANTUS) 20 Unit(s) SubCutaneous every morning  insulin lispro (HumaLOG) corrective regimen sliding scale   SubCutaneous three times a day before meals  insulin lispro (HumaLOG) corrective regimen sliding scale   SubCutaneous at bedtime  insulin lispro Injectable (HumaLOG) 6 Unit(s) SubCutaneous three times a day before meals  lisinopril 5 milliGRAM(s) Oral daily  omega-3-Acid Ethyl Esters 2 Gram(s) Oral two times a day    MEDICATIONS  (PRN):  acetaminophen   Tablet. 650 milliGRAM(s) Oral every 6 hours PRN mild and moderate pain  benzocaine 15 mG/menthol 3.6 mG Lozenge 1 Lozenge Oral four times a day PRN Sore Throat  oxyCODONE    IR 5 milliGRAM(s) Oral every 6 hours PRN Severe Pain (7 - 10)      PHYSICAL EXAM:  VITALS: T(C): 37.4 (08-30-18 @ 14:14)  T(F): 99.3 (08-30-18 @ 14:14), Max: 99.3 (08-30-18 @ 14:14)  HR: 85 (08-30-18 @ 14:14) (85 - 93)  BP: 141/95 (08-30-18 @ 14:14) (141/95 - 157/113)  RR:  (14 - 18)  SpO2:  (96% - 100%)  Wt(kg): --  GENERAL: NAD, well-groomed, well-developed  EYES: No proptosis, no injection  HEENT:  Atraumatic, Normocephalic, moist mucous membranes  THYROID: Normal size, no palpable nodules  RESPIRATORY: Clear to auscultation bilaterally; No rales, rhonchi, wheezing, or rubs  CARDIOVASCULAR: Regular rate and rhythm; No murmurs; no peripheral edema  GI: Soft, nontender, non distended, normal bowel sounds  CUSHING'S SIGNS: no striae    POCT Blood Glucose.: 177 mg/dL (08-30-18 @ 13:10)  POCT Blood Glucose.: 201 mg/dL (08-30-18 @ 08:59)  POCT Blood Glucose.: 204 mg/dL (08-29-18 @ 21:38)  POCT Blood Glucose.: 189 mg/dL (08-29-18 @ 11:36)  POCT Blood Glucose.: 253 mg/dL (08-29-18 @ 08:29)  POCT Blood Glucose.: 96 mg/dL (08-29-18 @ 07:00)  POCT Blood Glucose.: 107 mg/dL (08-29-18 @ 06:15)  POCT Blood Glucose.: 105 mg/dL (08-29-18 @ 05:07)  POCT Blood Glucose.: 114 mg/dL (08-29-18 @ 04:15)  POCT Blood Glucose.: 116 mg/dL (08-29-18 @ 03:15)  POCT Blood Glucose.: 140 mg/dL (08-29-18 @ 02:08)  POCT Blood Glucose.: 141 mg/dL (08-29-18 @ 01:08)  POCT Blood Glucose.: 148 mg/dL (08-29-18 @ 00:13)  POCT Blood Glucose.: 169 mg/dL (08-28-18 @ 23:02)  POCT Blood Glucose.: 159 mg/dL (08-28-18 @ 22:14)  POCT Blood Glucose.: 144 mg/dL (08-28-18 @ 21:06)  POCT Blood Glucose.: 114 mg/dL (08-28-18 @ 20:06)  POCT Blood Glucose.: 113 mg/dL (08-28-18 @ 19:10)  POCT Blood Glucose.: 107 mg/dL (08-28-18 @ 18:09)  POCT Blood Glucose.: 118 mg/dL (08-28-18 @ 17:04)  POCT Blood Glucose.: 119 mg/dL (08-28-18 @ 17:01)  POCT Blood Glucose.: 151 mg/dL (08-28-18 @ 16:14)  POCT Blood Glucose.: 171 mg/dL (08-28-18 @ 15:06)  POCT Blood Glucose.: 205 mg/dL (08-28-18 @ 13:58)  POCT Blood Glucose.: 244 mg/dL (08-28-18 @ 13:09)  POCT Blood Glucose.: 170 mg/dL (08-28-18 @ 11:59)  POCT Blood Glucose.: 187 mg/dL (08-28-18 @ 11:11)  POCT Blood Glucose.: 217 mg/dL (08-28-18 @ 10:05)  POCT Blood Glucose.: 169 mg/dL (08-28-18 @ 09:06)  POCT Blood Glucose.: 120 mg/dL (08-28-18 @ 08:07)  POCT Blood Glucose.: 132 mg/dL (08-28-18 @ 06:58)  POCT Blood Glucose.: 135 mg/dL (08-28-18 @ 05:58)  POCT Blood Glucose.: 144 mg/dL (08-28-18 @ 04:58)  POCT Blood Glucose.: 157 mg/dL (08-28-18 @ 04:04)  POCT Blood Glucose.: 96 mg/dL (08-28-18 @ 03:00)  POCT Blood Glucose.: 115 mg/dL (08-28-18 @ 02:00)  POCT Blood Glucose.: 123 mg/dL (08-28-18 @ 01:05)  POCT Blood Glucose.: 139 mg/dL (08-28-18 @ 00:03)  POCT Blood Glucose.: 192 mg/dL (08-27-18 @ 23:04)  POCT Blood Glucose.: 122 mg/dL (08-27-18 @ 21:34)  POCT Blood Glucose.: 146 mg/dL (08-27-18 @ 20:35)  POCT Blood Glucose.: 180 mg/dL (08-27-18 @ 19:30)  POCT Blood Glucose.: 213 mg/dL (08-27-18 @ 18:32)  POCT Blood Glucose.: 253 mg/dL (08-27-18 @ 17:41)    08-30    134<L>  |  95<L>  |  7   ----------------------------<  200<H>  4.4   |  26  |  0.77    EGFR if : 134  EGFR if non : 116    Ca    9.5      08-30  Mg     1.7     08-30  Phos  3.5     08-30    TPro  6.4  /  Alb  3.3  /  TBili  0.5  /  DBili  x   /  AST  23  /  ALT  38  /  AlkPhos  52  08-28          Thyroid Function Tests:  08-30 @ 06:51 TSH 2.58 FreeT4 1.42 T3 -- Anti TPO -- Anti Thyroglobulin Ab -- TSI --      Hemoglobin A1C, Whole Blood: 10.3 % <H> [4.0 - 5.6] (08-28-18 @ 04:00)

## 2018-08-30 NOTE — PROGRESS NOTE ADULT - PROBLEM SELECTOR PLAN 2
while inpatient and upon dc - c/w on tricor 145 mg daily  c/w lipitor 40 mg daily   c/w  Omega 3- fatty acids ( Lovaza) 2 gm twice a day.  weight loss + DASH + low fat diet+ exercise importance discussed and encouraged

## 2018-08-30 NOTE — DISCHARGE NOTE ADULT - CONDITIONS AT DISCHARGE
This Patient is stable for discharge Home, alert and independent with his care; all vital signs are stable and all Instructions for going Home are discussed and provided.  His Pain is well managed with the Dilaudid and diet is tolerated.

## 2018-08-30 NOTE — PROGRESS NOTE ADULT - PROBLEM SELECTOR PLAN 1
No clear etiology.  Possibly drainage from pancreatitis flare.  Pain improved today  -Will r/o HIV, G/C  -Will transition to oral oxycodone 5mg

## 2018-08-31 LAB
C TRACH RRNA SPEC QL NAA+PROBE: SIGNIFICANT CHANGE UP
N GONORRHOEA RRNA SPEC QL NAA+PROBE: SIGNIFICANT CHANGE UP
SPECIMEN SOURCE: SIGNIFICANT CHANGE UP

## 2018-09-01 LAB — ISLET CELL512 AB SER-ACNC: SIGNIFICANT CHANGE UP

## 2018-09-05 LAB — GAD65 AB SER-MCNC: 0 NMOL/L — SIGNIFICANT CHANGE UP

## 2018-09-13 ENCOUNTER — APPOINTMENT (OUTPATIENT)
Dept: ENDOCRINOLOGY | Facility: CLINIC | Age: 38
End: 2018-09-13
Payer: COMMERCIAL

## 2018-09-13 VITALS — WEIGHT: 24 LBS | BODY MASS INDEX: 3.36 KG/M2 | HEIGHT: 71 IN

## 2018-09-13 DIAGNOSIS — E11.9 TYPE 2 DIABETES MELLITUS W/OUT COMPLICATIONS: ICD-10-CM

## 2018-09-13 LAB — HBA1C MFR BLD HPLC: 10.3

## 2018-09-13 PROCEDURE — 82962 GLUCOSE BLOOD TEST: CPT

## 2018-09-13 PROCEDURE — G0108 DIAB MANAGE TRN  PER INDIV: CPT

## 2018-09-13 RX ORDER — INSULIN GLARGINE 100 [IU]/ML
100 INJECTION, SOLUTION SUBCUTANEOUS AT BEDTIME
Qty: 2 | Refills: 0 | Status: ACTIVE | COMMUNITY
Start: 2018-09-13 | End: 1900-01-01

## 2018-09-13 RX ORDER — FLASH GLUCOSE SENSOR
KIT MISCELLANEOUS
Qty: 1 | Refills: 0 | Status: ACTIVE | COMMUNITY
Start: 2018-09-13 | End: 1900-01-01

## 2018-09-13 RX ORDER — INSULIN LISPRO 100 [IU]/ML
100 INJECTION, SOLUTION INTRAVENOUS; SUBCUTANEOUS
Qty: 2 | Refills: 0 | Status: ACTIVE | COMMUNITY
Start: 2018-09-13 | End: 1900-01-01

## 2018-09-13 RX ORDER — FLASH GLUCOSE SENSOR
KIT MISCELLANEOUS
Qty: 3 | Refills: 11 | Status: ACTIVE | COMMUNITY
Start: 2018-09-13 | End: 1900-01-01

## 2018-09-13 RX ORDER — PEN NEEDLE, DIABETIC 29 G X1/2"
32G X 4 MM NEEDLE, DISPOSABLE MISCELLANEOUS
Qty: 1 | Refills: 1 | Status: ACTIVE | COMMUNITY
Start: 2018-09-13 | End: 1900-01-01

## 2018-09-25 ENCOUNTER — CLINICAL ADVICE (OUTPATIENT)
Age: 38
End: 2018-09-25

## 2018-09-25 RX ORDER — INSULIN ASPART 100 [IU]/ML
100 INJECTION, SOLUTION INTRAVENOUS; SUBCUTANEOUS
Qty: 2 | Refills: 0 | Status: ACTIVE | COMMUNITY
Start: 2018-09-25 | End: 1900-01-01

## 2018-11-27 ENCOUNTER — APPOINTMENT (OUTPATIENT)
Dept: ENDOCRINOLOGY | Facility: CLINIC | Age: 38
End: 2018-11-27

## 2019-09-13 NOTE — H&P ADULT - FAMILY HISTORY
yes
Mother  Still living? Unknown  Family history of endogenous hypertriglyceridemia, Age at diagnosis: Age Unknown

## 2021-04-27 ENCOUNTER — APPOINTMENT (OUTPATIENT)
Dept: HUMAN REPRODUCTION | Facility: CLINIC | Age: 41
End: 2021-04-27

## 2021-05-14 ENCOUNTER — APPOINTMENT (OUTPATIENT)
Dept: HUMAN REPRODUCTION | Facility: CLINIC | Age: 41
End: 2021-05-14
Payer: COMMERCIAL

## 2021-05-14 PROCEDURE — 36415 COLL VENOUS BLD VENIPUNCTURE: CPT

## 2021-05-14 PROCEDURE — 99072 ADDL SUPL MATRL&STAF TM PHE: CPT

## 2022-01-01 NOTE — DISCHARGE NOTE ADULT - CARE PROVIDERS DIRECT ADDRESSES
,[email or web address] No
